# Patient Record
Sex: MALE | Race: WHITE | Employment: FULL TIME | URBAN - METROPOLITAN AREA
[De-identification: names, ages, dates, MRNs, and addresses within clinical notes are randomized per-mention and may not be internally consistent; named-entity substitution may affect disease eponyms.]

---

## 2017-01-19 ENCOUNTER — TELEPHONE (OUTPATIENT)
Dept: INTERNAL MEDICINE CLINIC | Age: 42
End: 2017-01-19

## 2017-01-19 ENCOUNTER — PATIENT MESSAGE (OUTPATIENT)
Dept: INTERNAL MEDICINE CLINIC | Age: 42
End: 2017-01-19

## 2017-01-23 RX ORDER — OMEPRAZOLE 40 MG/1
CAPSULE, DELAYED RELEASE ORAL
Qty: 90 CAPSULE | Refills: 3 | Status: SHIPPED | OUTPATIENT
Start: 2017-01-23 | End: 2017-09-27 | Stop reason: SDUPTHER

## 2017-01-23 RX ORDER — FENOFIBRATE 160 MG/1
TABLET ORAL
Qty: 90 TABLET | Refills: 3 | Status: SHIPPED | OUTPATIENT
Start: 2017-01-23 | End: 2017-09-27 | Stop reason: SDUPTHER

## 2017-01-23 RX ORDER — LISINOPRIL 20 MG/1
TABLET ORAL
Qty: 90 TABLET | Refills: 3 | Status: SHIPPED | OUTPATIENT
Start: 2017-01-23 | End: 2017-09-27 | Stop reason: SDUPTHER

## 2017-01-23 RX ORDER — ATORVASTATIN CALCIUM 20 MG/1
20 TABLET, FILM COATED ORAL DAILY
Qty: 90 TABLET | Refills: 3 | Status: SHIPPED | OUTPATIENT
Start: 2017-01-23 | End: 2017-09-27 | Stop reason: SDUPTHER

## 2017-01-23 RX ORDER — INSULIN GLARGINE 100 [IU]/ML
INJECTION, SOLUTION SUBCUTANEOUS
Qty: 3 VIAL | Refills: 3 | Status: SHIPPED | OUTPATIENT
Start: 2017-01-23 | End: 2017-07-05 | Stop reason: SDUPTHER

## 2017-01-23 RX ORDER — OMEGA-3-ACID ETHYL ESTERS 1 G/1
1 CAPSULE, LIQUID FILLED ORAL DAILY
Qty: 90 CAPSULE | Refills: 3 | Status: SHIPPED | OUTPATIENT
Start: 2017-01-23 | End: 2017-09-27 | Stop reason: SDUPTHER

## 2017-01-26 ENCOUNTER — OFFICE VISIT (OUTPATIENT)
Dept: ORTHOPEDIC SURGERY | Age: 42
End: 2017-01-26

## 2017-01-26 VITALS
WEIGHT: 260 LBS | HEART RATE: 82 BPM | DIASTOLIC BLOOD PRESSURE: 91 MMHG | HEIGHT: 70 IN | RESPIRATION RATE: 12 BRPM | BODY MASS INDEX: 37.22 KG/M2 | SYSTOLIC BLOOD PRESSURE: 125 MMHG

## 2017-01-26 DIAGNOSIS — M25.512 ACUTE PAIN OF LEFT SHOULDER: Primary | ICD-10-CM

## 2017-01-26 DIAGNOSIS — M75.42 SHOULDER IMPINGEMENT, LEFT: ICD-10-CM

## 2017-01-26 PROCEDURE — 99244 OFF/OP CNSLTJ NEW/EST MOD 40: CPT | Performed by: ORTHOPAEDIC SURGERY

## 2017-01-26 PROCEDURE — 73030 X-RAY EXAM OF SHOULDER: CPT | Performed by: ORTHOPAEDIC SURGERY

## 2017-01-26 PROCEDURE — 20610 DRAIN/INJ JOINT/BURSA W/O US: CPT | Performed by: ORTHOPAEDIC SURGERY

## 2017-01-26 ASSESSMENT — ENCOUNTER SYMPTOMS
GASTROINTESTINAL NEGATIVE: 1
EYES NEGATIVE: 1
RESPIRATORY NEGATIVE: 1
BACK PAIN: 1

## 2017-05-08 ENCOUNTER — TELEPHONE (OUTPATIENT)
Dept: BARIATRICS/WEIGHT MGMT | Age: 42
End: 2017-05-08

## 2017-05-12 ASSESSMENT — ENCOUNTER SYMPTOMS
ABDOMINAL PAIN: 0
SHORTNESS OF BREATH: 0

## 2017-05-16 ENCOUNTER — OFFICE VISIT (OUTPATIENT)
Dept: INTERNAL MEDICINE CLINIC | Age: 42
End: 2017-05-16

## 2017-05-16 DIAGNOSIS — E11.65 TYPE 2 DIABETES MELLITUS WITH HYPERGLYCEMIA, WITHOUT LONG-TERM CURRENT USE OF INSULIN (HCC): Primary | ICD-10-CM

## 2017-05-16 DIAGNOSIS — I10 ESSENTIAL HYPERTENSION: ICD-10-CM

## 2017-05-16 DIAGNOSIS — E78.00 HYPERCHOLESTEREMIA: ICD-10-CM

## 2017-05-16 PROCEDURE — 99999 PR OFFICE/OUTPT VISIT,PROCEDURE ONLY: CPT | Performed by: INTERNAL MEDICINE

## 2017-09-21 ENCOUNTER — TELEPHONE (OUTPATIENT)
Dept: INTERNAL MEDICINE CLINIC | Age: 42
End: 2017-09-21

## 2017-09-21 DIAGNOSIS — E78.00 HYPERCHOLESTEREMIA: ICD-10-CM

## 2017-09-21 DIAGNOSIS — E11.65 TYPE 2 DIABETES MELLITUS WITH HYPERGLYCEMIA, WITHOUT LONG-TERM CURRENT USE OF INSULIN (HCC): Primary | ICD-10-CM

## 2017-09-25 ENCOUNTER — OFFICE VISIT (OUTPATIENT)
Dept: ORTHOPEDIC SURGERY | Age: 42
End: 2017-09-25

## 2017-09-25 VITALS
HEART RATE: 85 BPM | WEIGHT: 260 LBS | HEIGHT: 66 IN | DIASTOLIC BLOOD PRESSURE: 82 MMHG | RESPIRATION RATE: 12 BRPM | SYSTOLIC BLOOD PRESSURE: 139 MMHG | BODY MASS INDEX: 41.78 KG/M2

## 2017-09-25 DIAGNOSIS — M25.512 CHRONIC LEFT SHOULDER PAIN: Primary | ICD-10-CM

## 2017-09-25 DIAGNOSIS — G89.29 CHRONIC LEFT SHOULDER PAIN: Primary | ICD-10-CM

## 2017-09-25 DIAGNOSIS — M75.42 SHOULDER IMPINGEMENT, LEFT: ICD-10-CM

## 2017-09-25 PROCEDURE — 20610 DRAIN/INJ JOINT/BURSA W/O US: CPT | Performed by: ORTHOPAEDIC SURGERY

## 2017-09-25 PROCEDURE — 99213 OFFICE O/P EST LOW 20 MIN: CPT | Performed by: ORTHOPAEDIC SURGERY

## 2017-09-25 ASSESSMENT — ENCOUNTER SYMPTOMS
ABDOMINAL PAIN: 0
SHORTNESS OF BREATH: 0

## 2017-09-27 ENCOUNTER — OFFICE VISIT (OUTPATIENT)
Dept: INTERNAL MEDICINE CLINIC | Age: 42
End: 2017-09-27

## 2017-09-27 VITALS
SYSTOLIC BLOOD PRESSURE: 138 MMHG | RESPIRATION RATE: 16 BRPM | HEART RATE: 80 BPM | BODY MASS INDEX: 39.08 KG/M2 | WEIGHT: 273 LBS | HEIGHT: 70 IN | DIASTOLIC BLOOD PRESSURE: 88 MMHG

## 2017-09-27 DIAGNOSIS — E11.65 TYPE 2 DIABETES MELLITUS WITH HYPERGLYCEMIA, WITHOUT LONG-TERM CURRENT USE OF INSULIN (HCC): Primary | ICD-10-CM

## 2017-09-27 DIAGNOSIS — Z23 NEED FOR INFLUENZA VACCINATION: ICD-10-CM

## 2017-09-27 DIAGNOSIS — Z00.00 PREVENTATIVE HEALTH CARE: ICD-10-CM

## 2017-09-27 DIAGNOSIS — E78.00 HYPERCHOLESTEREMIA: ICD-10-CM

## 2017-09-27 DIAGNOSIS — Z90.3 S/P GASTRECTOMY: ICD-10-CM

## 2017-09-27 PROCEDURE — 99214 OFFICE O/P EST MOD 30 MIN: CPT | Performed by: INTERNAL MEDICINE

## 2017-09-27 PROCEDURE — 90471 IMMUNIZATION ADMIN: CPT | Performed by: INTERNAL MEDICINE

## 2017-09-27 PROCEDURE — 90686 IIV4 VACC NO PRSV 0.5 ML IM: CPT | Performed by: INTERNAL MEDICINE

## 2017-09-27 RX ORDER — INSULIN GLARGINE 100 [IU]/ML
INJECTION, SOLUTION SUBCUTANEOUS
Qty: 30 ML | Refills: 1 | Status: SHIPPED | OUTPATIENT
Start: 2017-09-27 | End: 2017-11-18 | Stop reason: SDUPTHER

## 2017-09-27 RX ORDER — OMEGA-3-ACID ETHYL ESTERS 1 G/1
1 CAPSULE, LIQUID FILLED ORAL DAILY
Qty: 90 CAPSULE | Refills: 3 | Status: SHIPPED | OUTPATIENT
Start: 2017-09-27 | End: 2017-11-28 | Stop reason: SDUPTHER

## 2017-09-27 RX ORDER — TRAMADOL HYDROCHLORIDE 50 MG/1
50 TABLET ORAL EVERY 6 HOURS PRN
Qty: 10 TABLET | Refills: 0 | Status: SHIPPED | OUTPATIENT
Start: 2017-09-27 | End: 2017-10-08 | Stop reason: ALTCHOICE

## 2017-09-27 RX ORDER — BLOOD SUGAR DIAGNOSTIC
STRIP MISCELLANEOUS
Qty: 100 EACH | Refills: 3 | Status: SHIPPED | OUTPATIENT
Start: 2017-09-27 | End: 2018-03-15 | Stop reason: SDUPTHER

## 2017-09-27 RX ORDER — OMEPRAZOLE 40 MG/1
CAPSULE, DELAYED RELEASE ORAL
Qty: 90 CAPSULE | Refills: 3 | Status: SHIPPED | OUTPATIENT
Start: 2017-09-27 | End: 2017-11-10 | Stop reason: SDUPTHER

## 2017-09-27 RX ORDER — NAPROXEN 250 MG/1
250 TABLET ORAL 2 TIMES DAILY WITH MEALS
Qty: 60 TABLET | Refills: 3 | Status: SHIPPED | OUTPATIENT
Start: 2017-09-27 | End: 2017-10-08 | Stop reason: ALTCHOICE

## 2017-09-27 RX ORDER — FENOFIBRATE 160 MG/1
TABLET ORAL
Qty: 90 TABLET | Refills: 3 | Status: SHIPPED | OUTPATIENT
Start: 2017-09-27 | End: 2017-11-10 | Stop reason: SDUPTHER

## 2017-09-27 RX ORDER — LISINOPRIL 20 MG/1
TABLET ORAL
Qty: 90 TABLET | Refills: 3 | Status: SHIPPED | OUTPATIENT
Start: 2017-09-27 | End: 2017-11-30 | Stop reason: SDUPTHER

## 2017-09-27 RX ORDER — ATORVASTATIN CALCIUM 20 MG/1
20 TABLET, FILM COATED ORAL DAILY
Qty: 90 TABLET | Refills: 3 | Status: SHIPPED | OUTPATIENT
Start: 2017-09-27 | End: 2017-11-28 | Stop reason: SDUPTHER

## 2017-10-09 ENCOUNTER — TELEPHONE (OUTPATIENT)
Dept: INTERNAL MEDICINE CLINIC | Age: 42
End: 2017-10-09

## 2017-10-09 NOTE — TELEPHONE ENCOUNTER
I called pt. And reminded them of overdue tests and they said they were going to come in and get it done.

## 2017-10-11 LAB
A/G RATIO: 1.6 (ref 1.1–2.2)
ALBUMIN SERPL-MCNC: 3.9 G/DL (ref 3.4–5)
ALP BLD-CCNC: 32 U/L (ref 40–129)
ALT SERPL-CCNC: 25 U/L (ref 10–40)
ANION GAP SERPL CALCULATED.3IONS-SCNC: 15 MMOL/L (ref 3–16)
AST SERPL-CCNC: 18 U/L (ref 15–37)
BASOPHILS ABSOLUTE: 0 K/UL (ref 0–0.2)
BASOPHILS RELATIVE PERCENT: 0.4 %
BILIRUB SERPL-MCNC: 0.3 MG/DL (ref 0–1)
BUN BLDV-MCNC: 20 MG/DL (ref 7–20)
CALCIUM SERPL-MCNC: 9.2 MG/DL (ref 8.3–10.6)
CHLORIDE BLD-SCNC: 101 MMOL/L (ref 99–110)
CHOLESTEROL, TOTAL: 186 MG/DL (ref 0–199)
CO2: 27 MMOL/L (ref 21–32)
CREAT SERPL-MCNC: 0.8 MG/DL (ref 0.9–1.3)
EOSINOPHILS ABSOLUTE: 0 K/UL (ref 0–0.6)
EOSINOPHILS RELATIVE PERCENT: 0.7 %
ESTIMATED AVERAGE GLUCOSE: 223.1 MG/DL
GFR AFRICAN AMERICAN: >60
GFR NON-AFRICAN AMERICAN: >60
GLOBULIN: 2.5 G/DL
GLUCOSE BLD-MCNC: 94 MG/DL (ref 70–99)
HBA1C MFR BLD: 9.4 %
HCT VFR BLD CALC: 43.2 % (ref 40.5–52.5)
HDLC SERPL-MCNC: 45 MG/DL (ref 40–60)
HEMOGLOBIN: 14.6 G/DL (ref 13.5–17.5)
LDL CHOLESTEROL CALCULATED: 110 MG/DL
LYMPHOCYTES ABSOLUTE: 1.4 K/UL (ref 1–5.1)
LYMPHOCYTES RELATIVE PERCENT: 25.4 %
MCH RBC QN AUTO: 30.7 PG (ref 26–34)
MCHC RBC AUTO-ENTMCNC: 33.9 G/DL (ref 31–36)
MCV RBC AUTO: 90.5 FL (ref 80–100)
MONOCYTES ABSOLUTE: 0.4 K/UL (ref 0–1.3)
MONOCYTES RELATIVE PERCENT: 7.5 %
NEUTROPHILS ABSOLUTE: 3.7 K/UL (ref 1.7–7.7)
NEUTROPHILS RELATIVE PERCENT: 66 %
PDW BLD-RTO: 12.7 % (ref 12.4–15.4)
PLATELET # BLD: 134 K/UL (ref 135–450)
PMV BLD AUTO: 9.4 FL (ref 5–10.5)
POTASSIUM SERPL-SCNC: 4 MMOL/L (ref 3.5–5.1)
RBC # BLD: 4.77 M/UL (ref 4.2–5.9)
SODIUM BLD-SCNC: 143 MMOL/L (ref 136–145)
TOTAL PROTEIN: 6.4 G/DL (ref 6.4–8.2)
TRIGL SERPL-MCNC: 153 MG/DL (ref 0–150)
TSH SERPL DL<=0.05 MIU/L-ACNC: 2.32 UIU/ML (ref 0.27–4.2)
VLDLC SERPL CALC-MCNC: 31 MG/DL
WBC # BLD: 5.6 K/UL (ref 4–11)

## 2017-10-31 ENCOUNTER — HOSPITAL ENCOUNTER (OUTPATIENT)
Dept: OTHER | Age: 42
Discharge: HOME | End: 2017-10-31
Attending: ORTHOPAEDIC SURGERY | Admitting: ORTHOPAEDIC SURGERY

## 2017-10-31 NOTE — FLOWSHEET NOTE
Exercise Program:   10/31/17 HEP was instructed and included shrugs, pinches, wand flexion and ABD standing, isometric ABD, serratus punch supine. Pt was given written hand outs and demonstrated exercises correctly. Pt expressed understanding of exercises. PT educated pt regarding the biomechanics of the shoulder and scapula and impingement. Manual Treatments:      Modalities:      Timed Code Treatment Minutes:  20    Total Treatment Minutes:  40    Treatment/Activity Tolerance:  [x] Patient tolerated treatment well [] Patient limited by fatigue  [] Patient limited by pain  [] Patient limited by other medical complications  [] Other:     Prognosis: [x] Good [] Fair  [] Poor    Patient Requires Follow-up: [x] Yes  [] No    Plan:   [] Continue per plan of care [] Alter current plan (see comments)  [x] Plan of care initiated [] Hold pending MD visit [] Discharge  Plan for Next Session: focus RC strengthening and scapulothoracic coordination.      Electronically signed by:  Leonides Negro, PT

## 2017-11-01 ENCOUNTER — HOSPITAL ENCOUNTER (OUTPATIENT)
Dept: OTHER | Age: 42
Discharge: HOME | End: 2017-11-22
Attending: ORTHOPAEDIC SURGERY | Admitting: ORTHOPAEDIC SURGERY

## 2017-11-02 ENCOUNTER — HOSPITAL ENCOUNTER (OUTPATIENT)
Dept: PHYSICAL THERAPY | Age: 42
Discharge: HOME | End: 2017-11-03
Admitting: ORTHOPAEDIC SURGERY

## 2017-11-10 RX ORDER — OMEPRAZOLE 40 MG/1
CAPSULE, DELAYED RELEASE ORAL
Qty: 90 CAPSULE | Refills: 3 | Status: SHIPPED | OUTPATIENT
Start: 2017-11-10 | End: 2018-08-27 | Stop reason: ALTCHOICE

## 2017-11-10 RX ORDER — FENOFIBRATE 160 MG/1
TABLET ORAL
Qty: 90 TABLET | Refills: 3 | Status: SHIPPED | OUTPATIENT
Start: 2017-11-10 | End: 2018-12-14 | Stop reason: SDUPTHER

## 2017-11-20 RX ORDER — INSULIN GLARGINE 100 [IU]/ML
INJECTION, SOLUTION SUBCUTANEOUS
Qty: 30 ML | Refills: 5 | Status: SHIPPED | OUTPATIENT
Start: 2017-11-20 | End: 2017-11-28 | Stop reason: SDUPTHER

## 2017-11-28 ENCOUNTER — PATIENT MESSAGE (OUTPATIENT)
Dept: INTERNAL MEDICINE CLINIC | Age: 42
End: 2017-11-28

## 2017-11-28 DIAGNOSIS — I10 ESSENTIAL HYPERTENSION: Primary | ICD-10-CM

## 2017-11-28 RX ORDER — ATORVASTATIN CALCIUM 20 MG/1
20 TABLET, FILM COATED ORAL DAILY
Qty: 90 TABLET | Refills: 3 | Status: SHIPPED | OUTPATIENT
Start: 2017-11-28 | End: 2018-03-15 | Stop reason: SDUPTHER

## 2017-11-28 RX ORDER — INSULIN GLARGINE 100 [IU]/ML
INJECTION, SOLUTION SUBCUTANEOUS
Qty: 3 VIAL | Refills: 3 | Status: SHIPPED | OUTPATIENT
Start: 2017-11-28 | End: 2018-03-15

## 2017-11-28 RX ORDER — OMEGA-3-ACID ETHYL ESTERS 1 G/1
1 CAPSULE, LIQUID FILLED ORAL DAILY
Qty: 90 CAPSULE | Refills: 3 | Status: SHIPPED | OUTPATIENT
Start: 2017-11-28 | End: 2018-11-03 | Stop reason: SDUPTHER

## 2017-11-28 NOTE — TELEPHONE ENCOUNTER
From: Aure Burrell  To: Lesvia Guaman MD  Sent: 11/28/2017 12:35 PM EST  Subject: Non-Urgent Medical Question    You asked me to let you know if/when I started the Keto Diet as you wanted to run some blood work at 4 weeks in. I started it 3 weeks ago, this is week number 4. I have lost 9.8 lbs so far. My sugar WAS normally around 250-350, since starting this new diet, my sugars have not been over 133.

## 2017-11-30 ENCOUNTER — OFFICE VISIT (OUTPATIENT)
Dept: ENDOCRINOLOGY | Age: 42
End: 2017-11-30

## 2017-11-30 VITALS
BODY MASS INDEX: 38.17 KG/M2 | HEIGHT: 70 IN | WEIGHT: 266.6 LBS | DIASTOLIC BLOOD PRESSURE: 78 MMHG | SYSTOLIC BLOOD PRESSURE: 120 MMHG | HEART RATE: 82 BPM | RESPIRATION RATE: 16 BRPM

## 2017-11-30 DIAGNOSIS — I10 ESSENTIAL HYPERTENSION: ICD-10-CM

## 2017-11-30 DIAGNOSIS — Z79.4 INSULIN-REQUIRING OR DEPENDENT TYPE II DIABETES MELLITUS (HCC): Primary | ICD-10-CM

## 2017-11-30 DIAGNOSIS — E11.9 INSULIN-REQUIRING OR DEPENDENT TYPE II DIABETES MELLITUS (HCC): Primary | ICD-10-CM

## 2017-11-30 LAB
CREATININE URINE: 127.3 MG/DL (ref 39–259)
MICROALBUMIN UR-MCNC: <1.2 MG/DL
MICROALBUMIN/CREAT UR-RTO: NORMAL MG/G (ref 0–30)

## 2017-11-30 PROCEDURE — 99214 OFFICE O/P EST MOD 30 MIN: CPT | Performed by: INTERNAL MEDICINE

## 2017-11-30 RX ORDER — LISINOPRIL 20 MG/1
TABLET ORAL
Qty: 90 TABLET | Refills: 3 | Status: SHIPPED | OUTPATIENT
Start: 2017-11-30 | End: 2018-11-03 | Stop reason: SDUPTHER

## 2017-11-30 NOTE — TELEPHONE ENCOUNTER
lisinopril (PRINIVIL;ZESTRIL) 20 MG tablet TAKE 1 TABLET BY MOUTH EVERY DAY     LYLA Leblanc [address] [place] [address] [phone] - F [phone]

## 2017-12-01 DIAGNOSIS — E11.65 TYPE 2 DIABETES MELLITUS WITH HYPERGLYCEMIA, WITHOUT LONG-TERM CURRENT USE OF INSULIN (HCC): ICD-10-CM

## 2017-12-01 DIAGNOSIS — E78.00 HYPERCHOLESTEREMIA: ICD-10-CM

## 2017-12-01 DIAGNOSIS — I10 ESSENTIAL HYPERTENSION: ICD-10-CM

## 2017-12-01 LAB
A/G RATIO: 1.6 (ref 1.1–2.2)
ALBUMIN SERPL-MCNC: 4 G/DL (ref 3.4–5)
ALP BLD-CCNC: 41 U/L (ref 40–129)
ALT SERPL-CCNC: 59 U/L (ref 10–40)
ANION GAP SERPL CALCULATED.3IONS-SCNC: 17 MMOL/L (ref 3–16)
AST SERPL-CCNC: 41 U/L (ref 15–37)
BASOPHILS ABSOLUTE: 0 K/UL (ref 0–0.2)
BASOPHILS RELATIVE PERCENT: 0.8 %
BILIRUB SERPL-MCNC: 0.9 MG/DL (ref 0–1)
BUN BLDV-MCNC: 19 MG/DL (ref 7–20)
CALCIUM SERPL-MCNC: 9 MG/DL (ref 8.3–10.6)
CHLORIDE BLD-SCNC: 97 MMOL/L (ref 99–110)
CHOLESTEROL, TOTAL: 238 MG/DL (ref 0–199)
CO2: 24 MMOL/L (ref 21–32)
CREAT SERPL-MCNC: 0.9 MG/DL (ref 0.9–1.3)
EOSINOPHILS ABSOLUTE: 0 K/UL (ref 0–0.6)
EOSINOPHILS RELATIVE PERCENT: 0.6 %
GFR AFRICAN AMERICAN: >60
GFR NON-AFRICAN AMERICAN: >60
GLOBULIN: 2.5 G/DL
GLUCOSE BLD-MCNC: 148 MG/DL (ref 70–99)
HCT VFR BLD CALC: 39.2 % (ref 40.5–52.5)
HDLC SERPL-MCNC: 20 MG/DL (ref 40–60)
HEMOGLOBIN: 13.5 G/DL (ref 13.5–17.5)
LDL CHOLESTEROL CALCULATED: ABNORMAL MG/DL
LDL CHOLESTEROL DIRECT: 80 MG/DL
LYMPHOCYTES ABSOLUTE: 0.7 K/UL (ref 1–5.1)
LYMPHOCYTES RELATIVE PERCENT: 19.5 %
MCH RBC QN AUTO: 31 PG (ref 26–34)
MCHC RBC AUTO-ENTMCNC: 34.6 G/DL (ref 31–36)
MCV RBC AUTO: 89.7 FL (ref 80–100)
MONOCYTES ABSOLUTE: 0.6 K/UL (ref 0–1.3)
MONOCYTES RELATIVE PERCENT: 17.1 %
NEUTROPHILS ABSOLUTE: 2.2 K/UL (ref 1.7–7.7)
NEUTROPHILS RELATIVE PERCENT: 62 %
PDW BLD-RTO: 14 % (ref 12.4–15.4)
PLATELET # BLD: 139 K/UL (ref 135–450)
PMV BLD AUTO: 8.8 FL (ref 5–10.5)
POTASSIUM SERPL-SCNC: 4 MMOL/L (ref 3.5–5.1)
RBC # BLD: 4.37 M/UL (ref 4.2–5.9)
SODIUM BLD-SCNC: 138 MMOL/L (ref 136–145)
TOTAL PROTEIN: 6.5 G/DL (ref 6.4–8.2)
TRIGL SERPL-MCNC: 1060 MG/DL (ref 0–150)
VLDLC SERPL CALC-MCNC: ABNORMAL MG/DL
WBC # BLD: 3.5 K/UL (ref 4–11)

## 2017-12-02 LAB
ESTIMATED AVERAGE GLUCOSE: 188.6 MG/DL
HBA1C MFR BLD: 8.2 %

## 2017-12-12 ENCOUNTER — PATIENT MESSAGE (OUTPATIENT)
Dept: INTERNAL MEDICINE CLINIC | Age: 42
End: 2017-12-12

## 2017-12-12 RX ORDER — METHOCARBAMOL 500 MG/1
500 TABLET, FILM COATED ORAL 4 TIMES DAILY
Qty: 40 TABLET | Refills: 0 | Status: SHIPPED | OUTPATIENT
Start: 2017-12-12 | End: 2017-12-22

## 2017-12-12 NOTE — TELEPHONE ENCOUNTER
From: Curtis Davidson  To: Fariha Greenwood MD  Sent: 12/12/2017 11:45 AM EST  Subject: Non-Urgent Medical Question    So, I think I threw my back out again. Happens every couple of years when it's cold outside. I want bending over to get ice from the freezer and felt 2 pops in my lower back. A day later, the pain is horrible, now today, it's just as bad, using a cane to slowly get around. Any kind of muscle relaxer I could get for this by chance? I've done PT in the past with muscle relaxers but I've his my PT allotment for my insurance. It's $350 to go to the ER to tell me what I already know.        Irma Kimr

## 2018-02-12 DIAGNOSIS — E11.65 TYPE 2 DIABETES MELLITUS WITH HYPERGLYCEMIA, WITHOUT LONG-TERM CURRENT USE OF INSULIN (HCC): ICD-10-CM

## 2018-02-12 DIAGNOSIS — E78.00 HYPERCHOLESTEREMIA: Primary | ICD-10-CM

## 2018-03-15 ENCOUNTER — OFFICE VISIT (OUTPATIENT)
Dept: ENDOCRINOLOGY | Age: 43
End: 2018-03-15

## 2018-03-15 VITALS
SYSTOLIC BLOOD PRESSURE: 138 MMHG | DIASTOLIC BLOOD PRESSURE: 78 MMHG | HEART RATE: 92 BPM | BODY MASS INDEX: 35.48 KG/M2 | HEIGHT: 70 IN | WEIGHT: 247.8 LBS | RESPIRATION RATE: 16 BRPM | OXYGEN SATURATION: 99 %

## 2018-03-15 DIAGNOSIS — I10 ESSENTIAL HYPERTENSION: ICD-10-CM

## 2018-03-15 DIAGNOSIS — E78.00 HYPERCHOLESTEREMIA: ICD-10-CM

## 2018-03-15 DIAGNOSIS — Z79.4 TYPE 2 DIABETES MELLITUS WITH HYPERGLYCEMIA, WITH LONG-TERM CURRENT USE OF INSULIN (HCC): Primary | ICD-10-CM

## 2018-03-15 DIAGNOSIS — E11.65 TYPE 2 DIABETES MELLITUS WITH HYPERGLYCEMIA, WITHOUT LONG-TERM CURRENT USE OF INSULIN (HCC): ICD-10-CM

## 2018-03-15 DIAGNOSIS — E11.65 TYPE 2 DIABETES MELLITUS WITH HYPERGLYCEMIA, WITH LONG-TERM CURRENT USE OF INSULIN (HCC): Primary | ICD-10-CM

## 2018-03-15 LAB
A/G RATIO: 2.1 (ref 1.1–2.2)
ALBUMIN SERPL-MCNC: 4.8 G/DL (ref 3.4–5)
ALP BLD-CCNC: 27 U/L (ref 40–129)
ALT SERPL-CCNC: 36 U/L (ref 10–40)
ANION GAP SERPL CALCULATED.3IONS-SCNC: 20 MMOL/L (ref 3–16)
AST SERPL-CCNC: 24 U/L (ref 15–37)
BILIRUB SERPL-MCNC: 0.4 MG/DL (ref 0–1)
BUN BLDV-MCNC: 22 MG/DL (ref 7–20)
CALCIUM SERPL-MCNC: 9.6 MG/DL (ref 8.3–10.6)
CHLORIDE BLD-SCNC: 100 MMOL/L (ref 99–110)
CHOLESTEROL, TOTAL: 197 MG/DL (ref 0–199)
CO2: 24 MMOL/L (ref 21–32)
CREAT SERPL-MCNC: 0.9 MG/DL (ref 0.9–1.3)
GFR AFRICAN AMERICAN: >60
GFR NON-AFRICAN AMERICAN: >60
GLOBULIN: 2.3 G/DL
GLUCOSE BLD-MCNC: 94 MG/DL (ref 70–99)
HDLC SERPL-MCNC: 40 MG/DL (ref 40–60)
LDL CHOLESTEROL CALCULATED: 130 MG/DL
POTASSIUM SERPL-SCNC: 3.8 MMOL/L (ref 3.5–5.1)
SODIUM BLD-SCNC: 144 MMOL/L (ref 136–145)
TOTAL PROTEIN: 7.1 G/DL (ref 6.4–8.2)
TRIGL SERPL-MCNC: 135 MG/DL (ref 0–150)
VLDLC SERPL CALC-MCNC: 27 MG/DL

## 2018-03-15 PROCEDURE — 99214 OFFICE O/P EST MOD 30 MIN: CPT | Performed by: INTERNAL MEDICINE

## 2018-03-15 RX ORDER — ATORVASTATIN CALCIUM 40 MG/1
40 TABLET, FILM COATED ORAL DAILY
Qty: 90 TABLET | Refills: 2 | Status: SHIPPED | OUTPATIENT
Start: 2018-03-15 | End: 2018-11-26 | Stop reason: SDUPTHER

## 2018-03-15 NOTE — PROGRESS NOTES
Seen as f/u patient for diabetes    Interim: on ketodiet    had bariatric surgery-sleeve gastrectomy , lost 90lb, off U-500 but now stable    Diagnosed with Type 2 diabetes mellitus in  1996  Known diabetic complications: none     Current diabetic medications   Lantus 18units  But taking 35 units occasionally  Metformin 1gm daily    Previous  U-500 40/35 units BID SSI 1 for 50 >150  -10 <80    Taking  25-35 once a day    Farxiga 5mg stopped due to yeast infection    Initial  Lantus 110 unit BID  Metformin 1000mg BID    Last A1c 9.4 on 10/17<------7.8 on 2/16<-----7.6 on 6/15<-----9.9 on 2/15<-----  8.0 on 11/14<----- 8.3 on 7/14<----10.6 on 3/14<---11.9 on 1/14<--- 9.4<---10.8    Prior visit with dietician: yes  Current diet: 0 Carbs low Isaiah  Current exercise: walking   Current monitoring regimen: home blood tests -1 times daily     Has brought blood glucose log/meter:Yes   Home blood sugar records:   137-357 when on regular diet  Any episodes of hypoglycemia? Occ    No Hx of CAD , PVD, CVA  H/o pancreatitis 2006    Hyperlipidemia:1/14 T.C 544 TG 3800 HDL 76   8/13     Fenofibrate 160 Lipitor 20mg    on 3/14 - he stopped fenofibrate and lipitor, now restarted    on 10/17  lipitor and fenofibrate      On 3/18    Last eye exam: 1.5 yr ago  Last foot exam: 3/18  Last microalbumin to creatinine ratio: 58 on 1/14---> 11/17  ACE-I or ARB: Lisinopril 20mg  ASA No  Smoking No    FH of diabetes, mom was diabetic, she was +600 units with no good control. TG was 28104. Sister does not have diabetes  Lost weight from 400 to 310 over 8 years    Review of Systems  Scanned lost 150lb    Objective:      /78 (Site: Right Arm, Position: Sitting, Cuff Size: Medium Adult)   Pulse 92   Resp 16   Ht 5' 10\" (1.778 m)   Wt 247 lb 12.8 oz (112.4 kg)   SpO2 99%   BMI 35.56 kg/m²   Wt Readings from Last 3 Encounters:   03/15/18 247 lb 12.8 oz (112.4 kg)   11/30/17 266 lb 9.6 oz (120.9 kg)   10/08/17 272 lb 6.4 oz (123.6 kg)     Constitutional: Well-developed, alert, appears stated age, cooperative, in no acute distress  H/E/N/M/T:atraumatic, normocephalic, external ears, nose, lips normal without lesions  Skin: Xanthoma/Xanthelasmas are  not present   Psychiatric: Judgement and Insight:  judgement and insight appear normal  Neuro: Normal without focal findings, speech is spontaneous, and movements are coordinated, alert and oriented x3     3/18  Skeletal foot exam is normal, no skin lesions, 10 g monofilament is 10/10 on the right and 10/10 on the left    Lab Reviewed   No components found for: CHLPL  Lab Results   Component Value Date    TRIG 135 03/15/2018    TRIG 1,060 (H) 12/01/2017    TRIG 153 (H) 10/11/2017     Lab Results   Component Value Date    HDL 40 03/15/2018    HDL 20 (L) 12/01/2017    HDL 45 10/11/2017     Lab Results   Component Value Date    LDLCALC 130 (H) 03/15/2018    LDLCALC see below 12/01/2017    LDLCALC 110 (H) 10/11/2017     Lab Results   Component Value Date    LABVLDL 27 03/15/2018    LABVLDL see below 12/01/2017    LABVLDL 31 10/11/2017     Lab Results   Component Value Date    LABA1C 8.2 12/01/2017       Assessment:     Marcin Rider is a 40 y.o. male with :    1.T2DM:Insulin resistant secondary to obesity, + FH. Avoid GLP agonist given pancreatitis, A1c high, recently started Keto diet. Reviewed log, blood glucose improved, A1c pending  2. HTN:At goal  3. HLD:He has familial hyperlipidemia. Discussed dietary restriction, continue fibrate and statin, added fish oil, TG improved, LDL high, increased lipitor  4. Obesity: s/p sleeve,losing weight    Plan:      Lantus 35 units daily   Add Humalog SSI if blood glucose persistently high >200   Avoid GLP given pancreatitis history   Advise to check blood sugar 2 times a day   Patient to send blood sugar log for titration. Advise to exercise regularly. Advise to low simple carbohydrate and protein with each  meal diet.

## 2018-03-16 ENCOUNTER — TELEPHONE (OUTPATIENT)
Dept: INTERNAL MEDICINE CLINIC | Age: 43
End: 2018-03-16

## 2018-03-16 LAB
ESTIMATED AVERAGE GLUCOSE: 105.4 MG/DL
HBA1C MFR BLD: 5.3 %

## 2018-05-08 ENCOUNTER — OFFICE VISIT (OUTPATIENT)
Dept: ORTHOPEDIC SURGERY | Age: 43
End: 2018-05-08

## 2018-05-08 VITALS
DIASTOLIC BLOOD PRESSURE: 88 MMHG | SYSTOLIC BLOOD PRESSURE: 122 MMHG | BODY MASS INDEX: 33.57 KG/M2 | HEART RATE: 82 BPM | WEIGHT: 239.8 LBS | HEIGHT: 71 IN

## 2018-05-08 DIAGNOSIS — M25.512 CHRONIC LEFT SHOULDER PAIN: Primary | ICD-10-CM

## 2018-05-08 DIAGNOSIS — M75.42 SHOULDER IMPINGEMENT, LEFT: ICD-10-CM

## 2018-05-08 DIAGNOSIS — G89.29 CHRONIC LEFT SHOULDER PAIN: Primary | ICD-10-CM

## 2018-05-08 PROCEDURE — 99213 OFFICE O/P EST LOW 20 MIN: CPT | Performed by: ORTHOPAEDIC SURGERY

## 2018-05-08 PROCEDURE — 20610 DRAIN/INJ JOINT/BURSA W/O US: CPT | Performed by: ORTHOPAEDIC SURGERY

## 2018-05-08 RX ORDER — METHYLPREDNISOLONE ACETATE 40 MG/ML
80 INJECTION, SUSPENSION INTRA-ARTICULAR; INTRALESIONAL; INTRAMUSCULAR; SOFT TISSUE ONCE
Status: COMPLETED | OUTPATIENT
Start: 2018-05-08 | End: 2018-05-08

## 2018-05-08 RX ADMIN — METHYLPREDNISOLONE ACETATE 80 MG: 40 INJECTION, SUSPENSION INTRA-ARTICULAR; INTRALESIONAL; INTRAMUSCULAR; SOFT TISSUE at 16:00

## 2018-05-08 ASSESSMENT — ENCOUNTER SYMPTOMS
RESPIRATORY NEGATIVE: 1
GASTROINTESTINAL NEGATIVE: 1
EYES NEGATIVE: 1

## 2018-06-21 ENCOUNTER — OFFICE VISIT (OUTPATIENT)
Dept: INTERNAL MEDICINE CLINIC | Age: 43
End: 2018-06-21

## 2018-06-21 VITALS
DIASTOLIC BLOOD PRESSURE: 88 MMHG | BODY MASS INDEX: 34.97 KG/M2 | TEMPERATURE: 98.6 F | HEIGHT: 71 IN | OXYGEN SATURATION: 98 % | SYSTOLIC BLOOD PRESSURE: 132 MMHG | WEIGHT: 249.8 LBS | HEART RATE: 78 BPM

## 2018-06-21 DIAGNOSIS — I10 ESSENTIAL HYPERTENSION: ICD-10-CM

## 2018-06-21 DIAGNOSIS — Z00.00 PHYSICAL EXAM: Primary | ICD-10-CM

## 2018-06-21 DIAGNOSIS — E66.09 CLASS 1 OBESITY DUE TO EXCESS CALORIES WITH SERIOUS COMORBIDITY AND BODY MASS INDEX (BMI) OF 34.0 TO 34.9 IN ADULT: ICD-10-CM

## 2018-06-21 DIAGNOSIS — E11.9 TYPE 2 DIABETES MELLITUS WITHOUT COMPLICATION, WITH LONG-TERM CURRENT USE OF INSULIN (HCC): ICD-10-CM

## 2018-06-21 DIAGNOSIS — Z79.4 TYPE 2 DIABETES MELLITUS WITHOUT COMPLICATION, WITH LONG-TERM CURRENT USE OF INSULIN (HCC): ICD-10-CM

## 2018-06-21 LAB — PROSTATE SPECIFIC ANTIGEN: 0.4 NG/ML (ref 0–4)

## 2018-06-21 PROCEDURE — 99396 PREV VISIT EST AGE 40-64: CPT | Performed by: NURSE PRACTITIONER

## 2018-06-21 ASSESSMENT — ENCOUNTER SYMPTOMS
VOMITING: 0
SHORTNESS OF BREATH: 0
ABDOMINAL PAIN: 0
DIARRHEA: 0
COUGH: 0
NAUSEA: 0

## 2018-06-21 ASSESSMENT — PATIENT HEALTH QUESTIONNAIRE - PHQ9
SUM OF ALL RESPONSES TO PHQ QUESTIONS 1-9: 0
SUM OF ALL RESPONSES TO PHQ9 QUESTIONS 1 & 2: 0
1. LITTLE INTEREST OR PLEASURE IN DOING THINGS: 0
2. FEELING DOWN, DEPRESSED OR HOPELESS: 0

## 2018-07-24 ENCOUNTER — TELEPHONE (OUTPATIENT)
Dept: INTERNAL MEDICINE CLINIC | Age: 43
End: 2018-07-24
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
## 2018-07-24 RX ORDER — DOXYCYCLINE HYCLATE 100 MG
100 TABLET ORAL 2 TIMES DAILY
Qty: 14 TABLET | Refills: 0 | Status: SHIPPED | OUTPATIENT
Start: 2018-07-24 | End: 2018-07-31

## 2018-08-02 ENCOUNTER — OFFICE VISIT (OUTPATIENT)
Dept: ENDOCRINOLOGY | Age: 43
End: 2018-08-02

## 2018-08-02 VITALS
BODY MASS INDEX: 35.96 KG/M2 | OXYGEN SATURATION: 99 % | DIASTOLIC BLOOD PRESSURE: 88 MMHG | HEART RATE: 74 BPM | WEIGHT: 251.2 LBS | SYSTOLIC BLOOD PRESSURE: 142 MMHG | HEIGHT: 70 IN | RESPIRATION RATE: 16 BRPM

## 2018-08-02 DIAGNOSIS — I10 ESSENTIAL HYPERTENSION: ICD-10-CM

## 2018-08-02 DIAGNOSIS — E11.65 TYPE 2 DIABETES MELLITUS WITH HYPERGLYCEMIA, WITH LONG-TERM CURRENT USE OF INSULIN (HCC): Primary | ICD-10-CM

## 2018-08-02 DIAGNOSIS — Z79.4 TYPE 2 DIABETES MELLITUS WITH HYPERGLYCEMIA, WITH LONG-TERM CURRENT USE OF INSULIN (HCC): Primary | ICD-10-CM

## 2018-08-02 DIAGNOSIS — E78.00 HYPERCHOLESTEREMIA: ICD-10-CM

## 2018-08-02 LAB — HBA1C MFR BLD: 6 %

## 2018-08-02 PROCEDURE — 99214 OFFICE O/P EST MOD 30 MIN: CPT | Performed by: INTERNAL MEDICINE

## 2018-08-02 PROCEDURE — 83036 HEMOGLOBIN GLYCOSYLATED A1C: CPT | Performed by: INTERNAL MEDICINE

## 2018-08-02 NOTE — PROGRESS NOTES
Seen as f/u patient for diabetes    Interim:  Glucose stable    had bariatric surgery-sleeve gastrectomy , lost 90lb, off U-500 but now stable    Diagnosed with Type 2 diabetes mellitus in  1996  Known diabetic complications: none     Current diabetic medications   Lantus 35 units  Metformin 1gm daily    Previous  U-500 40/35 units BID SSI 1 for 50 >150  -10 <80    Taking  25-35 once a day    Farxiga 5mg stopped due to yeast infection    Initial  Lantus 110 unit BID  Metformin 1000mg BID    Last A1c 6%<------9.4 on 10/17<------7.8 on 2/16<-----7.6 on 6/15<-----9.9 on 2/15<-----  8.0 on 11/14<----- 8.3 on 7/14<----10.6 on 3/14<---11.9 on 1/14<--- 9.4<---10.8    Prior visit with dietician: yes  Current diet: 0 Carbs low Isaiah  Current exercise: walking   Current monitoring regimen: home blood tests -1 times daily     Has brought blood glucose log/meter:Yes   Home blood sugar records: 105-18  Any episodes of hypoglycemia? Occ    No Hx of CAD , PVD, CVA  H/o pancreatitis 2006    Hyperlipidemia:  For years, tolerating medication  1/14 T.C 544 TG 3800 HDL 76   8/13     Fenofibrate 160 Lipitor 20mg    on 3/14 - he stopped fenofibrate and lipitor, now restarted    on 10/17  lipitor and fenofibrate      On 3/18     Last eye exam: 1.5 yr ago  Last foot exam: 3/18  Last microalbumin to creatinine ratio: 58 on 1/14---> 11/17    HTN: stable but now high  Lisinopril 20mg  ASA No  Smoking No    FH of diabetes, mom was diabetic, she was +600 units with no good control. TG was 92716. Sister does not have diabetes  Lost weight from 400 to 310 over 8 years    Review of Systems  Scanned reviewed total lost 150lb  12 lb gain since last visit    Objective:      BP (!) 142/88   Pulse 74   Resp 16   Ht 5' 10\" (1.778 m)   Wt 251 lb 3.2 oz (113.9 kg)   SpO2 99%   BMI 36.04 kg/m²   Wt Readings from Last 3 Encounters:   08/02/18 251 lb 3.2 oz (113.9 kg)   06/21/18 249 lb 12.8 oz (113.3 kg) diet.    Diabetes Care: recommend yearly eye exam, foot exam and urine microalbumin to   creatinine ratio.  Patient is due    -Hyperlipidemia, LDL goal is <100 mg/dl    Once off insulin, will see PCP only

## 2018-08-07 ENCOUNTER — TELEPHONE (OUTPATIENT)
Dept: ORTHOPEDIC SURGERY | Age: 43
End: 2018-08-07

## 2018-08-07 ENCOUNTER — PATIENT MESSAGE (OUTPATIENT)
Dept: ORTHOPEDIC SURGERY | Age: 43
End: 2018-08-07

## 2018-08-07 DIAGNOSIS — M75.42 SHOULDER IMPINGEMENT, LEFT: Primary | ICD-10-CM

## 2018-08-07 NOTE — TELEPHONE ENCOUNTER
From: Eduardo Sandoval  To: Sarita Humphrey MD  Sent: 8/7/2018 8:43 AM EDT  Subject: Non-Urgent Medical Question    Doctor Elizabeth Phan, I scheduled an appointment for Thurs for my left arm/shoulder. It is still painful and bothering me. You mentioned at the last appointment you wanted to get an MRI done if it's still an issue. Do I need to come in just to go get an MRI? Or is that something that will be done in another room at my appointment on Thursday?     Thank you,  Yonatan Kumari

## 2018-08-09 ENCOUNTER — OFFICE VISIT (OUTPATIENT)
Dept: ORTHOPEDIC SURGERY | Age: 43
End: 2018-08-09

## 2018-08-09 VITALS
DIASTOLIC BLOOD PRESSURE: 102 MMHG | SYSTOLIC BLOOD PRESSURE: 151 MMHG | BODY MASS INDEX: 35.14 KG/M2 | HEART RATE: 72 BPM | WEIGHT: 251 LBS | HEIGHT: 71 IN

## 2018-08-09 DIAGNOSIS — M75.42 SHOULDER IMPINGEMENT, LEFT: Primary | ICD-10-CM

## 2018-08-09 DIAGNOSIS — S43.432A SUPERIOR LABRUM ANTERIOR-TO-POSTERIOR (SLAP) TEAR OF LEFT SHOULDER: ICD-10-CM

## 2018-08-09 DIAGNOSIS — G89.29 CHRONIC LEFT SHOULDER PAIN: ICD-10-CM

## 2018-08-09 DIAGNOSIS — M25.512 CHRONIC LEFT SHOULDER PAIN: ICD-10-CM

## 2018-08-09 PROCEDURE — L3670 SO ACRO/CLAV CAN WEB PRE OTS: HCPCS | Performed by: ORTHOPAEDIC SURGERY

## 2018-08-09 PROCEDURE — 99213 OFFICE O/P EST LOW 20 MIN: CPT | Performed by: ORTHOPAEDIC SURGERY

## 2018-08-10 ENCOUNTER — TELEPHONE (OUTPATIENT)
Dept: ORTHOPEDIC SURGERY | Age: 43
End: 2018-08-10

## 2018-08-14 ENCOUNTER — OFFICE VISIT (OUTPATIENT)
Dept: INTERNAL MEDICINE CLINIC | Age: 43
End: 2018-08-14

## 2018-08-14 VITALS
OXYGEN SATURATION: 98 % | SYSTOLIC BLOOD PRESSURE: 138 MMHG | TEMPERATURE: 99.9 F | BODY MASS INDEX: 37.11 KG/M2 | HEART RATE: 82 BPM | WEIGHT: 259.2 LBS | HEIGHT: 70 IN | DIASTOLIC BLOOD PRESSURE: 88 MMHG

## 2018-08-14 DIAGNOSIS — Z01.818 PRE-OP EXAM: Primary | ICD-10-CM

## 2018-08-14 PROCEDURE — 99244 OFF/OP CNSLTJ NEW/EST MOD 40: CPT | Performed by: NURSE PRACTITIONER

## 2018-08-14 ASSESSMENT — ENCOUNTER SYMPTOMS
DIARRHEA: 0
COUGH: 0
VOMITING: 0
SHORTNESS OF BREATH: 0
NAUSEA: 0
ABDOMINAL PAIN: 0

## 2018-08-14 NOTE — PROGRESS NOTES
Preoperative Consultation      Kaye Sullivan  YOB: 1975    Date of Service:  8/14/2018    Vitals:    08/14/18 1603   BP: 138/88   Site: Left Arm   Position: Sitting   Cuff Size: Small Adult   Pulse: 82   Temp: 99.9 °F (37.7 °C)   TempSrc: Oral   SpO2: 98%   Weight: 259 lb 3.2 oz (117.6 kg)   Height: 5' 10\" (1.778 m)      Wt Readings from Last 2 Encounters:   08/14/18 259 lb 3.2 oz (117.6 kg)   08/09/18 251 lb (113.9 kg)     BP Readings from Last 3 Encounters:   08/14/18 138/88   08/09/18 (!) 151/102   08/02/18 (!) 142/88        No Known Allergies  Outpatient Prescriptions Marked as Taking for the 8/14/18 encounter (Office Visit) with LIBORIO Godoy CNP   Medication Sig Dispense Refill    Blood Glucose Monitoring Suppl (ACCU-CHEK COMPACT CARE KIT) KIT accu-chek glucose kit 1 kit 0    glucose blood VI test strips (ACCU-CHEK ACTIVE STRIPS) strip 1 each by In Vitro route 3 times daily As needed.  100 each 3    atorvastatin (LIPITOR) 40 MG tablet Take 1 tablet by mouth daily 90 tablet 2    insulin glargine (LANTUS SOLOSTAR) 100 UNIT/ML injection pen 35 units daily 15 pen 3    Insulin Pen Needle 32G X 4 MM MISC 1 each by Does not apply route daily 100 each 3    lisinopril (PRINIVIL;ZESTRIL) 20 MG tablet TAKE 1 TABLET BY MOUTH EVERY DAY 90 tablet 3    omega-3 acid ethyl esters (LOVAZA) 1 g capsule Take 1 capsule by mouth daily 90 capsule 3    omeprazole (PRILOSEC) 40 MG delayed release capsule TAKE 1 CAPSULE BY MOUTH DAILY 90 capsule 3    fenofibrate 160 MG tablet TAKE 1 TABLET BY MOUTH DAILY 90 tablet 3    metFORMIN (GLUCOPHAGE) 1000 MG tablet TAKE 1 TABLET BY MOUTH TWICE DAILY WITH MEALS 180 tablet 3    Insulin Pen Needle (B-D UF III MINI PEN NEEDLES) 31G X 5 MM MISC 1 each by Does not apply route daily 100 each 6    Insulin Syringes, Disposable, U-100 1 ML MISC 1 each by Does not apply route daily 3/4 inch, 31 Gauge 100 each 3    glucose blood VI test strips ([employer] TEST STRIPS) strip 1 each by In Vitro route 2 times daily. As needed. 100 each 3    Insulin Syringe-Needle U-100 (B-D INS SYRINGE 0.5CC/31GX5/16) 31G X 5/16\" 0.5 ML MISC  each 3    aspirin 81 MG EC tablet Take 81 mg by mouth daily. Chief Complaint   Patient presents with    Pre-op Exam     The following comorbid conditions were considered relevant to operative risk, so their status/stability was assessed:  Hyperlipidemia, hypertension, type 2 diabetes-insulin-dependent, GERD, sleep apnea-untreated, history of sleeve gastrectomy.      HPI:    This patient presents to the office today for a preoperative consultation at the request of surgeon, Dr. Eric Waters, who plans on performing left shoulder surgery on August 29 at [facility].     Planned anesthesia: General   Known anesthesia problems: None   Bleeding risk: No recent or remote history of abnormal bleeding  Personal or FH of DVT/PE: No   Recent steroid use: no  Exercise tolerance: Able to walk 3 blocks without dyspnea   Patient objection to receiving blood products: No    Patient Active Problem List   Diagnosis    Abdominal pain    Essential hypertension    Type 2 diabetes mellitus with hyperglycemia ([address]    Hypercholesteremia    Otalgia    Familial hyperlipidemia    Diabetes type 2, uncontrolled ([address]    Left leg pain    Sleep apnea    GERD (gastroesophageal reflux disease)    Migraine    Back pain    Morbid obesity with BMI of 40.0-44.9, adult ([address]    S/P sleeve gastrectomy    Change in bowel function       Past Medical History:   Diagnosis Date    Diabetes mellitus ([address]     GERD (gastroesophageal reflux disease)     Hyperlipidemia     Hypertension     Sleep apnea     Type II or unspecified type diabetes mellitus without mention of complication, not stated as uncontrolled      Past Surgical History:   Procedure Laterality Date    DENTAL SURGERY      SLEEVE GASTRECTOMY  4/7/15    laparoscopic    UPPER GASTROINTESTINAL ENDOSCOPY  11/14/14 negative. Physical Exam   Constitutional: He is oriented to person, place, and time. He appears well-developed and well-nourished. No distress. HENT:   Head: Normocephalic and atraumatic. Eyes: Pupils are equal, round, and reactive to light. Conjunctivae are normal.   Neck: Normal range of motion. Neck supple. Cardiovascular: Normal rate, regular rhythm, normal heart sounds and intact distal pulses. Exam reveals no gallop and no friction rub. No murmur heard. Pulmonary/Chest: Effort normal and breath sounds normal. No respiratory distress. He has no wheezes. He has no rales. Musculoskeletal: Normal range of motion. Neurological: He is alert and oriented to person, place, and time. Skin: Skin is warm and dry. No rash noted. He is not diaphoretic. Psychiatric: He has a normal mood and affect. His behavior is normal. Judgment and thought content normal.   Nursing note and vitals reviewed. Assessment/Plan:     Gail Zepeda was seen today for pre-op exam.    Diagnoses and all orders for this visit:    Pre-op exam    1. Preoperative workup as follows: none  2. Change in medication regimen before surgery: Discontinue ASA, ibuprofen and omega 3 vitamin 7 days before surgery  3. Prophylaxis for cardiac events with perioperative beta-blockers:Not indicated    4. Deep vein thrombosis prophylaxis: to be determined by surgical team  5. No contraindications to planned surgery      Active Cardiac Conditions: HTN- controlled   Active Pulmonary Conditions: h/o sleep apnea - better since weight loss, never used cpap  History of Cardiac Conditions: none  History of Pulmonary Conditions: former smoker- quit 25 years ago    Previous surgeries requiring anesthesia: wisdom teeth, sleeve gastrectomy, endoscopy    Clinical Risk Factors: CAD: 1 ,  DM: 1,  CHF: 0,  CVA: 0,  Renal Disease: 0    Based on the Revised Cardiac Risk Index the pt is Class Class II risk for surgery.

## 2018-08-27 ENCOUNTER — PAT TELEPHONE (OUTPATIENT)
Dept: PREADMISSION TESTING | Age: 43
End: 2018-08-27

## 2018-08-27 VITALS — BODY MASS INDEX: 36.08 KG/M2 | HEIGHT: 70 IN | WEIGHT: 252 LBS

## 2018-08-27 ASSESSMENT — PAIN DESCRIPTION - PAIN TYPE: TYPE: ACUTE PAIN

## 2018-08-27 ASSESSMENT — PAIN SCALES - GENERAL: PAINLEVEL_OUTOF10: 4

## 2018-08-27 ASSESSMENT — PAIN DESCRIPTION - FREQUENCY: FREQUENCY: INTERMITTENT

## 2018-08-27 ASSESSMENT — PAIN DESCRIPTION - ORIENTATION: ORIENTATION: LEFT

## 2018-08-27 ASSESSMENT — PAIN - FUNCTIONAL ASSESSMENT: PAIN_FUNCTIONAL_ASSESSMENT: 0-10

## 2018-08-27 ASSESSMENT — PAIN DESCRIPTION - LOCATION: LOCATION: ARM;SHOULDER

## 2018-08-27 ASSESSMENT — PAIN DESCRIPTION - DESCRIPTORS: DESCRIPTORS: SHOOTING;NUMBNESS

## 2018-08-27 NOTE — PROGRESS NOTES
C-Difficile admission screening and protocol:     * Admitted with diarrhea? YES____    NO__X___     *Prior history of C-Diff. In last 3 months? YES____   NO_X____     *Antibiotic use in the past 6-8 weeks? NO______YES__X--tick bite____                 If yes which  ANTIBIOTIC AND REASON______     *Prior hospitalization or nursing home in the last month?  YES____   NO_X___

## 2018-08-28 ENCOUNTER — TELEPHONE (OUTPATIENT)
Dept: ORTHOPEDIC SURGERY | Age: 43
End: 2018-08-28

## 2018-08-28 DIAGNOSIS — M25.512 CHRONIC LEFT SHOULDER PAIN: ICD-10-CM

## 2018-08-28 DIAGNOSIS — M75.42 SHOULDER IMPINGEMENT, LEFT: Primary | ICD-10-CM

## 2018-08-28 DIAGNOSIS — M25.512 ACUTE PAIN OF LEFT SHOULDER: ICD-10-CM

## 2018-08-28 DIAGNOSIS — G89.29 CHRONIC LEFT SHOULDER PAIN: ICD-10-CM

## 2018-08-28 DIAGNOSIS — S43.432A SUPERIOR LABRUM ANTERIOR-TO-POSTERIOR (SLAP) TEAR OF LEFT SHOULDER: ICD-10-CM

## 2018-08-28 RX ORDER — PROMETHAZINE HYDROCHLORIDE 25 MG/1
25 TABLET ORAL EVERY 6 HOURS PRN
Qty: 30 TABLET | Refills: 0 | Status: SHIPPED | OUTPATIENT
Start: 2018-08-28 | End: 2018-09-04

## 2018-08-28 RX ORDER — OXYCODONE HYDROCHLORIDE AND ACETAMINOPHEN 5; 325 MG/1; MG/1
1 TABLET ORAL EVERY 6 HOURS PRN
Qty: 28 TABLET | Refills: 0 | Status: SHIPPED | OUTPATIENT
Start: 2018-08-29 | End: 2018-09-06

## 2018-08-28 RX ORDER — DOCUSATE SODIUM 100 MG/1
100 CAPSULE, LIQUID FILLED ORAL 2 TIMES DAILY
Qty: 60 CAPSULE | Refills: 0 | Status: SHIPPED | OUTPATIENT
Start: 2018-08-28 | End: 2018-11-06

## 2018-08-28 NOTE — TELEPHONE ENCOUNTER
Called patient and spoke with patient regarding surgery. Patient will arrive at [facility] at 7:25 for surgery at 9:25 with sling. NPO at midnight.

## 2018-08-29 ENCOUNTER — HOSPITAL ENCOUNTER (OUTPATIENT)
Dept: SURGERY | Age: 43
Discharge: HOME | End: 2018-08-29
Attending: ORTHOPAEDIC SURGERY | Admitting: ORTHOPAEDIC SURGERY

## 2018-08-29 VITALS
HEART RATE: 74 BPM | OXYGEN SATURATION: 98 % | WEIGHT: 254.41 LBS | TEMPERATURE: 96.8 F | DIASTOLIC BLOOD PRESSURE: 81 MMHG | SYSTOLIC BLOOD PRESSURE: 136 MMHG | HEIGHT: 70 IN | RESPIRATION RATE: 18 BRPM | BODY MASS INDEX: 36.42 KG/M2

## 2018-08-29 LAB
GLUCOSE BLD-MCNC: 107 MG/DL (ref 70–99)
GLUCOSE BLD-MCNC: 91 MG/DL (ref 70–99)
PERFORMED ON: ABNORMAL
PERFORMED ON: NORMAL

## 2018-08-29 RX ORDER — SODIUM CHLORIDE 0.9 % (FLUSH) 0.9 %
10 SYRINGE (ML) INJECTION PRN
Status: DISCONTINUED | OUTPATIENT
Start: 2018-08-29 | End: 2018-08-30 | Stop reason: HOSPADM

## 2018-08-29 RX ORDER — ONDANSETRON 2 MG/ML
4 INJECTION INTRAMUSCULAR; INTRAVENOUS
Status: COMPLETED | OUTPATIENT
Start: 2018-08-29 | End: 2018-08-29

## 2018-08-29 RX ORDER — OXYCODONE HYDROCHLORIDE AND ACETAMINOPHEN 5; 325 MG/1; MG/1
1 TABLET ORAL PRN
Status: ACTIVE | OUTPATIENT
Start: 2018-08-29 | End: 2018-08-29

## 2018-08-29 RX ORDER — MIDAZOLAM HYDROCHLORIDE 1 MG/ML
INJECTION INTRAMUSCULAR; INTRAVENOUS
Status: DISPENSED
Start: 2018-08-29 | End: 2018-08-29

## 2018-08-29 RX ORDER — FENTANYL CITRATE 50 UG/ML
INJECTION, SOLUTION INTRAMUSCULAR; INTRAVENOUS
Status: DISPENSED
Start: 2018-08-29 | End: 2018-08-29

## 2018-08-29 RX ORDER — FENTANYL CITRATE 50 UG/ML
25 INJECTION, SOLUTION INTRAMUSCULAR; INTRAVENOUS EVERY 5 MIN PRN
Status: DISCONTINUED | OUTPATIENT
Start: 2018-08-29 | End: 2018-08-30 | Stop reason: HOSPADM

## 2018-08-29 RX ORDER — SODIUM CHLORIDE 0.9 % (FLUSH) 0.9 %
10 SYRINGE (ML) INJECTION EVERY 12 HOURS SCHEDULED
Status: DISCONTINUED | OUTPATIENT
Start: 2018-08-29 | End: 2018-08-30 | Stop reason: HOSPADM

## 2018-08-29 RX ORDER — OXYCODONE HYDROCHLORIDE AND ACETAMINOPHEN 5; 325 MG/1; MG/1
2 TABLET ORAL PRN
Status: ACTIVE | OUTPATIENT
Start: 2018-08-29 | End: 2018-08-29

## 2018-08-29 RX ORDER — FENTANYL CITRATE 50 UG/ML
50 INJECTION, SOLUTION INTRAMUSCULAR; INTRAVENOUS ONCE
Status: COMPLETED | OUTPATIENT
Start: 2018-08-29 | End: 2018-08-29

## 2018-08-29 RX ORDER — MIDAZOLAM HYDROCHLORIDE 1 MG/ML
2 INJECTION INTRAMUSCULAR; INTRAVENOUS ONCE
Status: COMPLETED | OUTPATIENT
Start: 2018-08-29 | End: 2018-08-29

## 2018-08-29 RX ORDER — SODIUM CHLORIDE 9 MG/ML
INJECTION, SOLUTION INTRAVENOUS CONTINUOUS
Status: DISCONTINUED | OUTPATIENT
Start: 2018-08-29 | End: 2018-08-30 | Stop reason: HOSPADM

## 2018-08-29 RX ADMIN — SODIUM CHLORIDE: 9 INJECTION, SOLUTION INTRAVENOUS at 12:13

## 2018-08-29 RX ADMIN — ONDANSETRON 4 MG: 2 INJECTION INTRAMUSCULAR; INTRAVENOUS at 11:22

## 2018-08-29 RX ADMIN — FENTANYL CITRATE 100 MCG: 50 INJECTION, SOLUTION INTRAMUSCULAR; INTRAVENOUS at 08:45

## 2018-08-29 RX ADMIN — MIDAZOLAM HYDROCHLORIDE 2 MG: 1 INJECTION INTRAMUSCULAR; INTRAVENOUS at 08:45

## 2018-08-29 RX ADMIN — SODIUM CHLORIDE: 9 INJECTION, SOLUTION INTRAVENOUS at 07:54

## 2018-08-29 ASSESSMENT — PAIN DESCRIPTION - DESCRIPTORS: DESCRIPTORS: ACHING

## 2018-08-29 ASSESSMENT — PAIN DESCRIPTION - PAIN TYPE
TYPE: SURGICAL PAIN

## 2018-08-29 ASSESSMENT — ENCOUNTER SYMPTOMS: SHORTNESS OF BREATH: 0

## 2018-08-29 ASSESSMENT — PAIN SCALES - GENERAL
PAINLEVEL_OUTOF10: 0
PAINLEVEL_OUTOF10: 2
PAINLEVEL_OUTOF10: 1
PAINLEVEL_OUTOF10: 1
PAINLEVEL_OUTOF10: 0

## 2018-08-29 ASSESSMENT — PAIN - FUNCTIONAL ASSESSMENT: PAIN_FUNCTIONAL_ASSESSMENT: 0-10

## 2018-08-29 ASSESSMENT — LIFESTYLE VARIABLES: SMOKING_STATUS: 0

## 2018-08-29 NOTE — OP NOTE
was placed. Diseased tendon was removed, and the tendon was then tenodesed to the  proximal humerus in the subpectoral region with a 7-mm Arthrex tenodesis  screw. The deep tissue was irrigated and closed with 0 Vicryl and 2-0  Vicryl and the skin with a Monocryl. Steri-Strips, sterile dressing, and  sling were applied. He was then awoken and transported to recovery without complications. Please note Dr. Betito Kevin was essential for patient positioning, manipulation and exposure during the case.       Ilya Rice MD    D: 08/29/2018 11:48:35       T: 08/29/2018 19:40:42     [email or web address]  Job#: [id number]     Doc#: [id number]    CC:

## 2018-08-29 NOTE — ANESTHESIA POST-OP
[facility] Department of Anesthesiology  Post-Anesthesia Note       Name:  Nico Lyman                                         Age:  44 y.o.   MRN:  [id number]     Last Vitals & Oxygen Saturation: /85   Pulse 67   Temp 96.8 °F (36 °C) (Temporal)   Resp 18   Ht 5' 10\" (1.778 m)   Wt 254 lb 6.6 oz (115.4 kg)   SpO2 98%   BMI 36.50 kg/m²   Patient Vitals for the past 4 hrs:   BP Temp Temp src Pulse Resp SpO2   08/29/18 1156 - 96.8 °F (36 °C) Temporal - - -   08/29/18 1153 139/85 - - 67 18 98 %   08/29/18 1137 - - - 70 14 95 %   08/29/18 1132 131/81 - - 70 16 95 %   08/29/18 1127 136/84 97 °F (36.1 °C) Temporal 70 14 99 %   08/29/18 1122 (!) 144/85 - - 71 21 100 %   08/29/18 1115 136/84 - - 73 23 97 %   08/29/18 1112 (!) 150/86 - - 72 20 94 %   08/29/18 1109 (!) 153/84 97 °F (36.1 °C) Temporal 67 20 99 %       Level of consciousness: awake, alert and oriented    Respiratory: stable     Cardiovascular: stable     Hydration: stable     PONV: stable     Post-op pain: adequate analgesia    Post-op assessment: no apparent anesthetic complications    Complications:  none    Daniele Delgado MD  August 29, 2018   12:16 PM

## 2018-08-29 NOTE — ANESTHESIA PRE-OP
[facility] Department of Anesthesiology  Pre-Anesthesia Evaluation/Consultation       Name:  Kelsey Santana  : 1975  Age:  44 y.o.                                            MRN:  [id number]  Date: 2018           Procedure (Scheduled):  Left shoulder scope, debridement capsular release, subacromial decompression, biceps tenodesis  Surgeon:  Dr. Rodriguez Hearing      No Known Allergies  Patient Active Problem List   Diagnosis    Abdominal pain    Essential hypertension    Type 2 diabetes mellitus with hyperglycemia ([address]    Hypercholesteremia    Otalgia    Familial hyperlipidemia    Diabetes type 2, uncontrolled ([address]    Left leg pain    Sleep apnea    GERD (gastroesophageal reflux disease)    Migraine    Back pain    Morbid obesity with BMI of 40.0-44.9, adult [facility]    S/P sleeve gastrectomy    Change in bowel function     Past Medical History:   Diagnosis Date    Diabetes mellitus ([address]     GERD (gastroesophageal reflux disease)     minimal since gastric sleeve    Hyperlipidemia     Hypertension     Sleep apnea     does not use Cpap    Type II or unspecified type diabetes mellitus without mention of complication, not stated as uncontrolled     Wears glasses      Past Surgical History:   Procedure Laterality Date    DENTAL SURGERY      ENDOSCOPY, COLON, DIAGNOSTIC  2014    SLEEVE GASTRECTOMY  4/7/15    laparoscopic     Social History   Substance Use Topics    Smoking status: Former Smoker     Packs/day: 1.00     Years: 6.00     Types: Cigarettes     Quit date: 1999    Smokeless tobacco: Former User    Alcohol use No     Medications  Current Outpatient Prescriptions on File Prior to Encounter   Medication Sig Dispense Refill    Calcium-Magnesium-Vitamin D (CITRACAL CALCIUM+D PO) Take by mouth daily      atorvastatin (LIPITOR) 40 MG tablet Take 1 tablet by mouth daily 90 tablet 2    insulin glargine (LANTUS SOLOSTAR) 100 UNIT/ML injection pen 35 units daily (Patient taking differently: 35-45 Units daily Dose is based on blood sugar) 15 pen 3    lisinopril (PRINIVIL;ZESTRIL) 20 MG tablet TAKE 1 TABLET BY MOUTH EVERY DAY 90 tablet 3    omega-3 acid ethyl esters (LOVAZA) 1 g capsule Take 1 capsule by mouth daily 90 capsule 3    fenofibrate 160 MG tablet TAKE 1 TABLET BY MOUTH DAILY 90 tablet 3    metFORMIN (GLUCOPHAGE) 1000 MG tablet TAKE 1 TABLET BY MOUTH TWICE DAILY WITH MEALS (Patient taking differently: 1,000 mg daily (with breakfast) ) 180 tablet 3    oxyCODONE-acetaminophen (PERCOCET) 5-325 MG per tablet Take 1 tablet by mouth every 6 hours as needed for Pain for up to 28 doses. [facility] Date: 8/29/18 28 tablet 0    promethazine (PHENERGAN) 25 MG tablet Take 1 tablet by mouth every 6 hours as needed for Nausea 30 tablet 0    docusate sodium (COLACE) 100 MG capsule Take 1 capsule by mouth 2 times daily 60 capsule 0    Blood Glucose Monitoring Suppl (ACCU-CHEK COMPACT CARE KIT) KIT accu-chek glucose kit 1 kit 0    glucose blood VI test strips (ACCU-CHEK ACTIVE STRIPS) strip 1 each by In Vitro route 3 times daily As needed. 100 each 3    Insulin Pen Needle 32G X 4 MM MISC 1 each by Does not apply route daily 100 each 3    Insulin Pen Needle (B-D UF III MINI PEN NEEDLES) 31G X 5 MM MISC 1 each by Does not apply route daily 100 each 6    Insulin Syringes, Disposable, U-100 1 ML MISC 1 each by Does not apply route daily 3/4 inch, 31 Gauge 100 each 3    glucose blood VI test strips ([employer] TEST STRIPS) strip 1 each by In Vitro route 2 times daily. As needed. 100 each 3    Insulin Syringe-Needle U-100 (B-D INS SYRINGE 0.5CC/31GX5/16) 31G X 5/16\" 0.5 ML MISC  each 3    aspirin 81 MG EC tablet Take 81 mg by mouth daily. No current facility-administered medications on file prior to encounter.       Current Outpatient Prescriptions   Medication Sig Dispense Refill    Calcium-Magnesium-Vitamin D (CITRACAL CALCIUM+D PO) Take by mouth daily      atorvastatin Provider Last Rate Last Dose    sodium chloride flush 0.9 % injection 10 mL  10 mL Intravenous 2 times per day Caleb Loya MD        sodium chloride flush 0.9 % injection 10 mL  10 mL Intravenous PRN Caleb Loya MD        0.9 % sodium chloride infusion   Intravenous Continuous aCleb Loya  mL/hr at 18 0754      ceFAZolin (ANCEF) 2 g in dextrose 5 % 100 mL IVPB  2 g Intravenous Once Claudene Lerner, MD        fentaNYL (SUBLIMAZE) 100 MCG/2ML injection             midazolam (VERSED) 2 MG/2ML injection              Vital Signs (Current)   Vitals:    18   BP: 138/81   Pulse: 75   Resp: 14   Temp: 97 °F (36.1 °C)   SpO2: 99%     Vital Signs Statistics (for past 48 hrs)     Temp  Av °F (36.1 °C)  Min: 97 °F (36.1 °C)   Min taken time: 18  Max: 97 °F (36.1 °C)   Max taken time: 18  Pulse  Av  Min: 76   Min taken time: 18  Max: 75   Max taken time: 18  Resp  Av  Min: 15   Min taken time: 18  Max: 14   Max taken time: 18  BP  Min: 138/81   Min taken time: 18  Max: 138/81   Max taken time: 18  SpO2  Av %  Min: 99 %   Min taken time: 18  Max: 99 %   Max taken time: 18    BP Readings from Last 3 Encounters:   18 138/81   18 138/88   18 (!) 151/102     BMI  Body mass index is 36.5 kg/m². Estimated body mass index is 36.5 kg/m² as calculated from the following:    Height as of this encounter: 5' 10\" (1.778 m). Weight as of this encounter: 254 lb 6.6 oz (115.4 kg).     CBC   Lab Results   Component Value Date    WBC 3.5 2017    RBC 4.37 2017    HGB 13.5 2017    HCT 39.2 2017    MCV 89.7 2017    RDW 14.0 2017     2017     CMP    Lab Results   Component Value Date     03/15/2018    K 3.8 03/15/2018     03/15/2018    CO2 24 03/15/2018    BUN 22 03/15/2018    CREATININE 0.9 03/15/2018    GFRAA >60 03/15/2018    GFRAA >60 02/11/2013    AGRATIO 2.1 03/15/2018    LABGLOM >60 03/15/2018    GLUCOSE 94 03/15/2018    PROT 7.1 03/15/2018    PROT 7.2 02/11/2013    CALCIUM 9.6 03/15/2018    BILITOT 0.4 03/15/2018    ALKPHOS 27 03/15/2018    AST 24 03/15/2018    ALT 36 03/15/2018     BMP    Lab Results   Component Value Date     03/15/2018    K 3.8 03/15/2018     03/15/2018    CO2 24 03/15/2018    BUN 22 03/15/2018    CREATININE 0.9 03/15/2018    CALCIUM 9.6 03/15/2018    GFRAA >60 03/15/2018    GFRAA >60 02/11/2013    LABGLOM >60 03/15/2018    GLUCOSE 94 03/15/2018     POCGlucose  No results for input(s): GLUCOSE in the last 72 hours.    Coags  No results found for: PROTIME, INR, APTT  HCG (If Applicable) No results found for: PREGTESTUR, PREGSERUM, HCG, HCGQUANT   ABGs No results found for: PHART, PO2ART, SCY8DVQ, BCU6LVJ, BEART, Q9XBIDLA   Type & Screen (If Applicable)  No results found for: LABABO, LABRH                         BMI: Wt Readings from Last 3 Encounters:       NPO Status:   Date of last liquid consumption: 08/29/18   Time of last liquid consumption: 0000   Date of last solid food consumption: 08/29/18      Time of last solid consumption: 0000       Anesthesia Evaluation  Patient summary reviewed no history of anesthetic complications:   Airway: Mallampati: II  TM distance: >3 FB   Neck ROM: full  Mouth opening: > = 3 FB Dental:      Comment: No loose teeth    Pulmonary: breath sounds clear to auscultation  (+) sleep apnea:      (-) COPD, asthma, shortness of breath, recent URI and not a current smoker                           Cardiovascular:    (+) hypertension:, hyperlipidemia    (-) valvular problems/murmurs, past MI, CAD, CABG/stent, dysrhythmias,  angina,  CHF, orthopnea, PND and murmur      Rhythm: regular  Rate: normal                    Neuro/Psych:   (+) headaches:,    (-) seizures, neuromuscular disease, TIA and CVA           GI/Hepatic/Renal:   (+)

## 2018-08-29 NOTE — PROGRESS NOTES
Had ambulated to the bathroom to void. Jacinta well. Uo qs. Returned to room, sitting up in chair. Folded pillow placed under his left elbow. Sipping apple juice. Jacinta well. States feels just slightly queasy in his stomach, but tolerates po fluids ok. Likes to eat his special saltine crackers from home. Asking about when he can go home. Aware of what to start eating at home.

## 2018-08-29 NOTE — BRIEF OP NOTE
Brief Postoperative Note    Margaret Reilly  YOB: 1975  [id number]    Pre-operative Diagnosis: left shoulder slap tear, impingement, capsulitis    Post-operative Diagnosis: Same    Procedure: scope, release, hood, sad, open bieps tenodesis    Anesthesia: General    Surgeons/Assistants: MD Eliot; Sidney Burnham MD    Estimated Blood Loss: less than 50     Complications: None    Specimens: Was Not Obtained    Findings: as above    Electronically signed by Osmani Douglas MD on 8/29/2018 at 10:47 AM

## 2018-08-29 NOTE — PROGRESS NOTES
VSS. Ready to go home. IV d/c'd. Pressure drsg over site. Will be dressing to go home with wife's help. Stable.

## 2018-08-30 ENCOUNTER — HOSPITAL ENCOUNTER (OUTPATIENT)
Dept: PHYSICAL THERAPY | Age: 43
Discharge: HOME | End: 2018-08-31
Attending: ORTHOPAEDIC SURGERY | Admitting: ORTHOPAEDIC SURGERY

## 2018-08-30 ENCOUNTER — OFFICE VISIT (OUTPATIENT)
Dept: ORTHOPEDIC SURGERY | Age: 43
End: 2018-08-30

## 2018-08-30 VITALS — BODY MASS INDEX: 35.14 KG/M2 | WEIGHT: 251 LBS | HEIGHT: 71 IN

## 2018-08-30 DIAGNOSIS — M25.512 CHRONIC LEFT SHOULDER PAIN: ICD-10-CM

## 2018-08-30 DIAGNOSIS — S43.432A SUPERIOR LABRUM ANTERIOR-TO-POSTERIOR (SLAP) TEAR OF LEFT SHOULDER: ICD-10-CM

## 2018-08-30 DIAGNOSIS — M75.02 ADHESIVE CAPSULITIS OF LEFT SHOULDER: ICD-10-CM

## 2018-08-30 DIAGNOSIS — G89.29 CHRONIC LEFT SHOULDER PAIN: ICD-10-CM

## 2018-08-30 DIAGNOSIS — M75.42 SHOULDER IMPINGEMENT, LEFT: Primary | ICD-10-CM

## 2018-08-30 DIAGNOSIS — M25.512 ACUTE PAIN OF LEFT SHOULDER: ICD-10-CM

## 2018-08-30 PROCEDURE — 99024 POSTOP FOLLOW-UP VISIT: CPT | Performed by: ORTHOPAEDIC SURGERY

## 2018-08-30 NOTE — FLOWSHEET NOTE
[facility], [place]      Physical Therapy Daily Treatment Note  Date:  2018    Patient Name:  Joceline Ortega    :  1975  MRN: [id number]  Medical/Treatment Diagnosis Information:  · Diagnosis: M75.02 (ICD-10-CM) - Adhesive capsulitis of left shoulder; S43.432A (ICD-10-CM) - Superior labrum anterior-to-posterior (SLAP) tear of left shoulder; M75.42 (ICD-10-CM) - Shoulder impingement, left  · Treatment Diagnosis: M25.512 (ICD-10-CM) - Acute pain of left shoulder  Insurance/Certification information:  PT Insurance Information: Aetna  Physician Information:  Referring Practitioner: [address]  of care signed (Y/N):     Date of Patient follow up with Physician:     G-Code (if applicable):      Date G-Code Applied:    PT G-Codes 18  Functional Assessment Tool Used: UEFI  Score: 100%  Functional Limitation: Carrying, moving and handling objects  Carrying, Moving and Handling Objects Current Status (): 100 percent impaired, limited or restricted  Carrying, Moving and Handling Objects Goal Status ():  At least 1 percent but less than 20 percent impaired, limited or restricted    Progress Note: [x]  Yes  []  No  Next due by: Visit #10      Latex Allergy:  [x]NO      []YES   Preferred Language for Healthcare:   [x]English       []other:    Visit # Insurance Allowable   1 MN     Pain level:  8/10     SUBJECTIVE:  See eval    OBJECTIVE: See eval  Observation:   Test measurements:      ROM PROM AROM  Comment    L R L R    Flexion        Abduction        ER        IR        Other        Other             Strength L R Comment   Flexion      Abduction      ER      IR      Supraspinatus      Upper Trap      Lower Trap      Mid Trap      Rhomboids      Biceps      Triceps      Horizontal Abduction      Horizontal Adduction      Lats          RESTRICTIONS/PRECAUTIONS: see PT RX in media       Exercises:  Exercise/Equipment Resistance Repetitions Other comments Stretching/PROM      Wand      Table Slides  Flexion 10x10    UE Gridley      Pulleys      Pendulum      PROM Elbow  10x10    Isometrics      Retraction        3'    Weight shift      Flexion      Abduction      External Rotation      Internal Rotation      Biceps      Triceps            PRE's      Flexion      Abduction      External Rotation      Internal Rotation      Shrugs      EXT      Reverse Flys      Serratus      Horizontal Abd with ER      Biceps      Triceps      Retraction            Cable Column/Theraband      External Rotation      Internal Rotation      Shrugs      Lats      Ext      Flex      Scapular Retraction      BIC      TRIC      PNF            Dynamic Stability            Plyoback                Therapeutic Exercise and NMR EXR  [] (69809) Provided verbal/tactile cueing for activities related to strengthening, flexibility, endurance, ROM  for improvements in scapular, scapulothoracic and UE control with self care, reaching, carrying, lifting, house/yardwork, driving/computer work.    [] (35611) Provided verbal/tactile cueing for activities related to improving balance, coordination, kinesthetic sense, posture, motor skill, proprioception  to assist with  scapular, scapulothoracic and UE control with self care, reaching, carrying, lifting, house/yardwork, driving/computer work. Therapeutic Activities:    [] (59626 or 53588) Provided verbal/tactile cueing for activities related to improving balance, coordination, kinesthetic sense, posture, motor skill, proprioception and motor activation to allow for proper function of scapular, scapulothoracic and UE control with self care, carrying, lifting, driving/computer work.      Home Exercise Program:    [x] (02425) Reviewed/Progressed HEP activities related to strengthening, flexibility, endurance, ROM of scapular, scapulothoracic and UE control with self care, reaching, carrying, lifting, house/yardwork, driving/computer work  [] (03879) Reviewed/Progressed HEP activities related to improving balance, coordination, kinesthetic sense, posture, motor skill, proprioception of scapular, scapulothoracic and UE control with self care, reaching, carrying, lifting, house/yardwork, driving/computer work      Manual Treatments:  PROM / STM / Oscillations-Mobs:  G-I, II, III, IV (PA's, Inf., Post.)  [] (33528) Provided manual therapy to mobilize soft tissue/joints of cervical/CT, scapular GHJ and UE for the purpose of modulating pain, promoting relaxation,  increasing ROM, reducing/eliminating soft tissue swelling/inflammation/restriction, improving soft tissue extensibility and allowing for proper ROM for normal function with self care, reaching, carrying, lifting, house/yardwork, driving/computer work    Modalities:      Charges:  Timed Code Treatment Minutes: 29   Total Treatment Minutes: 50     [x] EVAL (LOW) 87147   [] EVAL (MOD) 94363   [] EVAL (HIGH) 23926   [] RE-EVAL   [x] IB(72117) x  1   [] IONTO  [] NMR (14517) x      [] VASO  [] Manual (79972) x       [] Other:  [x] TA x  1    [] Mech Traction (14538)  [] ES(attended) (71458)      [] ES (un) (31542):       GOALS:  Patient stated goal: to get back to daily activities without pain      Therapist goals for Patient:   Short Term Goals: To be achieved in: 2 weeks  1. Independent in HEP and progression per patient tolerance, in order to prevent re-injury. 2. Patient will have a decrease in pain to facilitate improvement in movement, function, and ADLs as indicated by Functional Deficits.     Long Term Goals: To be achieved in: 6-8 weeks  1. Disability index score of 19% or less for the UEFI to assist with reaching prior level of function. 2. Patient will demonstrate increased AROM to 150+ flex/abduction, 70+ ER, 50+ IR to allow for proper joint functioning as indicated by patients Functional Deficits.    3. Patient will demonstrate an increase in Strength to 4+/5 or greater to allow for proper

## 2018-08-30 NOTE — PLAN OF CARE
factors: pain medication   Provocative factors: movement of shoulder     Type: [x]Constant   []Intermittent  []Radiating []Localized []other:     Numbness/Tingling: pt denies    Functional Limitations/Impairments: []Lifting/reaching []Grooming []Carrying    []ADL's []Driving []Sports/Recreations   []Other:    Occupation/School:     Living Status/Prior Level of Function: Independent with ADLs and IADLs,     OBJECTIVE:       Joint mobility:   []Normal    [x]Hypo   []Hyper    Palpation: TTP near incisions     Functional Mobility/Transfers:     Posture: protective posturing without sling     Bandages/Dressings/Incisions:  Bandages were removed and steri-strips were inspected. Shoulder was cleaned with rubbing alcohol and appropriate dressing applied. Gait: (include devices/WB status) wfl      Palpation Scale- Lori and Bea- (Grade 0-4)     Description X / -- Comments   Grade 0 No tenderness     Grade 1 Mild tenderness without grimace or flinch     Grade 2  Moderate tenderness plus grimace or flinch     Grade 3  Severe tenderness plus marked flinch or withdrawal X    Grade 4 Unbearable tenderness; patient withdrawals with light touch      DR Lori, Melba Speaker GM. Myofascial trigger points show spontaneous needle emg activity. Spine 1993; 18 :[id number]. [x] Patient history, allergies, meds reviewed. Medical chart reviewed. See intake form. Review Of Systems (ROS):  [x]Performed Review of systems (Integumentary, CardioPulmonary, Neurological) by intake and observation. Intake form has been scanned into medical record. Patient has been instructed to contact their primary care physician regarding ROS issues if not already being addressed at this time.       Co-morbidities/Complexities (which will affect course of rehabilitation):   []None           Arthritic conditions   []Rheumatoid arthritis (M05.9)  []Osteoarthritis (M19.91)   Cardiovascular conditions

## 2018-09-01 ENCOUNTER — HOSPITAL ENCOUNTER (OUTPATIENT)
Dept: OTHER | Age: 43
Discharge: HOME | End: 2018-09-01
Attending: ORTHOPAEDIC SURGERY | Admitting: ORTHOPAEDIC SURGERY

## 2018-09-06 ENCOUNTER — OFFICE VISIT (OUTPATIENT)
Dept: ORTHOPEDIC SURGERY | Age: 43
End: 2018-09-06

## 2018-09-06 ENCOUNTER — HOSPITAL ENCOUNTER (OUTPATIENT)
Dept: PHYSICAL THERAPY | Age: 43
Discharge: HOME | End: 2018-09-07
Admitting: ORTHOPAEDIC SURGERY

## 2018-09-06 VITALS — BODY MASS INDEX: 35.14 KG/M2 | HEIGHT: 71 IN | WEIGHT: 251 LBS

## 2018-09-06 DIAGNOSIS — M75.42 SHOULDER IMPINGEMENT, LEFT: Primary | ICD-10-CM

## 2018-09-06 DIAGNOSIS — M75.02 ADHESIVE CAPSULITIS OF LEFT SHOULDER: ICD-10-CM

## 2018-09-06 DIAGNOSIS — M25.512 CHRONIC LEFT SHOULDER PAIN: ICD-10-CM

## 2018-09-06 DIAGNOSIS — S43.432A SUPERIOR LABRUM ANTERIOR-TO-POSTERIOR (SLAP) TEAR OF LEFT SHOULDER: ICD-10-CM

## 2018-09-06 DIAGNOSIS — G89.29 CHRONIC LEFT SHOULDER PAIN: ICD-10-CM

## 2018-09-06 PROCEDURE — 99024 POSTOP FOLLOW-UP VISIT: CPT | Performed by: ORTHOPAEDIC SURGERY

## 2018-09-12 ENCOUNTER — HOSPITAL ENCOUNTER (OUTPATIENT)
Dept: PHYSICAL THERAPY | Age: 43
Discharge: HOME | End: 2018-09-13
Admitting: ORTHOPAEDIC SURGERY

## 2018-09-12 NOTE — FLOWSHEET NOTE
[facility], [place]      Physical Therapy Daily Treatment Note  Date:  2018    Patient Name:  Romina Quintanilla    :  1975  MRN: [id number]  Medical/Treatment Diagnosis Information:  · Diagnosis: M75.02 (ICD-10-CM) - Adhesive capsulitis of left shoulder; S43.432A (ICD-10-CM) - Superior labrum anterior-to-posterior (SLAP) tear of left shoulder; M75.42 (ICD-10-CM) - Shoulder impingement, left  · Treatment Diagnosis: M25.512 (ICD-10-CM) - Acute pain of left shoulder  Insurance/Certification information:  PT Insurance Information: Aetna  Physician Information:  Referring Practitioner: [address]  of care signed (Y/N):     Date of Patient follow up with Physician:     G-Code (if applicable):      Date G-Code Applied:    PT G-Codes 18  Functional Assessment Tool Used: UEFI  Score: 100%  Functional Limitation: Carrying, moving and handling objects  Carrying, Moving and Handling Objects Current Status (): 100 percent impaired, limited or restricted  Carrying, Moving and Handling Objects Goal Status (): At least 1 percent but less than 20 percent impaired, limited or restricted    Progress Note: [x]  Yes  []  No  Next due by: Visit #10      Latex Allergy:  [x]NO      []YES   Preferred Language for Healthcare:   [x]English       []other:    Visit # Insurance Allowable   3 MN     Pain level:  0/10     SUBJECTIVE: Pt is 2 weeks sp. pt notes he has no pain at all, he was able to sleep in bed without issue.      OBJECTIVE: 18   Observation:   Test measurements:      ROM PROM AROM  Comment    L R L R    Flexion 153 pulleys        Abduction 162 pulleys        ER 45 wand supine       IR        Other        Other             Strength L R Comment   Flexion      Abduction      ER      IR      Supraspinatus      Upper Trap      Lower Trap      Mid Trap      Rhomboids      Biceps      Triceps      Horizontal Abduction      Horizontal Adduction      Lats RESTRICTIONS/PRECAUTIONS: Immediate full ROM, sling x4 weeks, no resisted biceps for 4 weeks. see PT RX in media       Exercises:  Exercise/Equipment Resistance Repetitions Other comments   Stretching/PROM      Wand  ER 10x10 supine  Flexion 10x10 supine     Table Slides  Flexion; scaption; abduction 10x10    UE Sabael      Pulleys  Flexion; scaption; abduction 10x10    Pendulum      PROM Elbow  10x10    Isometrics      Retraction        3'    Weight shift      Flexion      Abduction      External Rotation      Internal Rotation      Biceps      Triceps            PRE's      Flexion      Abduction      External Rotation      Internal Rotation      Shrugs  3x10    EXT      Reverse Flys      Serratus  3x10     Horizontal Abd with ER      Biceps      Triceps      Retraction  3x10          Cable Column/Theraband      External Rotation      Internal Rotation      Shrugs      Lats      Ext      Flex      Scapular Retraction      BIC      TRIC      PNF            Dynamic Stability            Plyoback                Therapeutic Exercise and NMR EXR  [] (83609) Provided verbal/tactile cueing for activities related to strengthening, flexibility, endurance, ROM  for improvements in scapular, scapulothoracic and UE control with self care, reaching, carrying, lifting, house/yardwork, driving/computer work.    [] (27464) Provided verbal/tactile cueing for activities related to improving balance, coordination, kinesthetic sense, posture, motor skill, proprioception  to assist with  scapular, scapulothoracic and UE control with self care, reaching, carrying, lifting, house/yardwork, driving/computer work.     Therapeutic Activities:    [] (11911 or 57044) Provided verbal/tactile cueing for activities related to improving balance, coordination, kinesthetic sense, posture, motor skill, proprioception and motor activation to allow for proper function of scapular, scapulothoracic and UE control with self care, carrying, lifting, driving/computer work. Home Exercise Program:    [x] (18014) Reviewed/Progressed HEP activities related to strengthening, flexibility, endurance, ROM of scapular, scapulothoracic and UE control with self care, reaching, carrying, lifting, house/yardwork, driving/computer work  [] (25796) Reviewed/Progressed HEP activities related to improving balance, coordination, kinesthetic sense, posture, motor skill, proprioception of scapular, scapulothoracic and UE control with self care, reaching, carrying, lifting, house/yardwork, driving/computer work      Manual Treatments:  PROM / STM / Oscillations-Mobs:  G-I, II, III, IV (PA's, Inf., Post.)  [] (01509) Provided manual therapy to mobilize soft tissue/joints of cervical/CT, scapular GHJ and UE for the purpose of modulating pain, promoting relaxation,  increasing ROM, reducing/eliminating soft tissue swelling/inflammation/restriction, improving soft tissue extensibility and allowing for proper ROM for normal function with self care, reaching, carrying, lifting, house/yardwork, driving/computer work    Modalities:      Charges:  Timed Code Treatment Minutes: 39   Total Treatment Minutes: 50     [] EVAL (LOW) 46284   [] EVAL (MOD) 37353   [] EVAL (HIGH) 69942   [] RE-EVAL   [x] ZU(51120) x  2   [] IONTO  [] NMR (72341) x      [] VASO  [] Manual (05274) x       [] Other:  [x] TA x  1    [] Mech Traction (84981)  [] ES(attended) (52033)      [] ES (un) (01790):       GOALS:  Patient stated goal: to get back to daily activities without pain      Therapist goals for Patient:   Short Term Goals: To be achieved in: 2 weeks  1. Independent in HEP and progression per patient tolerance, in order to prevent re-injury. 2. Patient will have a decrease in pain to facilitate improvement in movement, function, and ADLs as indicated by Functional Deficits.     Long Term Goals: To be achieved in: 6-8 weeks  1.  Disability index score of 19% or less for the UEFI to assist with

## 2018-09-13 DIAGNOSIS — M75.02 ADHESIVE CAPSULITIS OF LEFT SHOULDER: ICD-10-CM

## 2018-09-13 PROCEDURE — MISCPULLYB&C PULLY'S B&C: Performed by: ORTHOPAEDIC SURGERY

## 2018-09-20 ENCOUNTER — HOSPITAL ENCOUNTER (OUTPATIENT)
Dept: PHYSICAL THERAPY | Age: 43
Discharge: HOME | End: 2018-09-21
Admitting: ORTHOPAEDIC SURGERY

## 2018-09-20 NOTE — FLOWSHEET NOTE
UEFI to assist with reaching prior level of function. 2. Patient will demonstrate increased AROM to 150+ flex/abduction, 70+ ER, 50+ IR to allow for proper joint functioning as indicated by patients Functional Deficits. 3. Patient will demonstrate an increase in Strength to 4+/5 or greater to allow for proper functional mobility as indicated by patients Functional Deficits. 4. Patient will return to overhead ADLs without increased symptoms or restriction. Progression Towards Functional goals:  [] Patient is progressing as expected towards functional goals listed. [] Progression is slowed due to complexities listed. [] Progression has been slowed due to co-morbidities. [x] Plan just implemented, too soon to assess goals progression  [] Other:     ASSESSMENT:      Treatment/Activity Tolerance:  [] Patient tolerated treatment well [] Patient limited by fatique  [] Patient limited by pain  [] Patient limited by other medical complications  [] Other: Pt shows improved ROM today, able to progress to IR. He was able to add isometrics today with no pain. Avoided flexion d/t bicep precautions. Pt to see MD next week, continue with POC as advised.      Prognosis: [] Good [] Fair  [] Poor    Patient Requires Follow-up: [x] Yes  [] No    PLAN:   [] Continue per plan of care [] Alter current plan (see comments)  [x] Plan of care initiated [] Hold pending MD visit [] Discharge    Electronically signed by:  Too Ruiz, PT, DPT, Cert DN

## 2018-09-27 ENCOUNTER — OFFICE VISIT (OUTPATIENT)
Dept: ORTHOPEDIC SURGERY | Age: 43
End: 2018-09-27

## 2018-09-27 ENCOUNTER — HOSPITAL ENCOUNTER (OUTPATIENT)
Dept: PHYSICAL THERAPY | Age: 43
Setting detail: THERAPIES SERIES
Discharge: HOME | End: 2018-09-27
Payer: COMMERCIAL

## 2018-09-27 VITALS
SYSTOLIC BLOOD PRESSURE: 135 MMHG | WEIGHT: 247 LBS | BODY MASS INDEX: 34.58 KG/M2 | HEIGHT: 71 IN | DIASTOLIC BLOOD PRESSURE: 93 MMHG | HEART RATE: 85 BPM

## 2018-09-27 DIAGNOSIS — G89.29 CHRONIC LEFT SHOULDER PAIN: ICD-10-CM

## 2018-09-27 DIAGNOSIS — M75.42 SHOULDER IMPINGEMENT, LEFT: ICD-10-CM

## 2018-09-27 DIAGNOSIS — M75.02 ADHESIVE CAPSULITIS OF LEFT SHOULDER: Primary | ICD-10-CM

## 2018-09-27 DIAGNOSIS — S43.432A SUPERIOR LABRUM ANTERIOR-TO-POSTERIOR (SLAP) TEAR OF LEFT SHOULDER: ICD-10-CM

## 2018-09-27 DIAGNOSIS — M25.512 CHRONIC LEFT SHOULDER PAIN: ICD-10-CM

## 2018-09-27 PROCEDURE — 97530 THERAPEUTIC ACTIVITIES: CPT

## 2018-09-27 PROCEDURE — 99024 POSTOP FOLLOW-UP VISIT: CPT | Performed by: ORTHOPAEDIC SURGERY

## 2018-09-27 PROCEDURE — 97110 THERAPEUTIC EXERCISES: CPT

## 2018-09-27 NOTE — PROGRESS NOTES
Nico Lyman returns today month out from left shoulder arthroscopy decompression and capsular release and biceps tenodesis. He is doing very well. Typically he is pain-free. He has not required any anti-inflammatory or pain medicine since postop week 1. He says that after he stretches he'll have some soreness for maybe 15-30 minutes but resolves quickly. At worst his pain is 2 or 3 out of 10 after doing exercises. Today for elevation is 170°. External rotation 60°. Strength is 4+ over 5 in all planes. He has no tenderness along the biceps. His incision is healing up. He has no pain stressing the biceps or supination or flexion. Assessment: Doing well status post left shoulder surgery. Plan: He can discontinue sling. Continue with PT. Follow-up with me in 6 weeks. This note was created using voice recognition software. It has been proofread, but occasionally errors remain. Please disregard these errors. They will be corrected as they are noted.

## 2018-09-27 NOTE — FLOWSHEET NOTE
[facility], [place]      Physical Therapy Daily Treatment Note  Date:  2018    Patient Name:  Josie Parks    :  1975  MRN: [id number]  Medical/Treatment Diagnosis Information:  · Diagnosis: M75.02 (ICD-10-CM) - Adhesive capsulitis of left shoulder; S43.432A (ICD-10-CM) - Superior labrum anterior-to-posterior (SLAP) tear of left shoulder; M75.42 (ICD-10-CM) - Shoulder impingement, left  · Treatment Diagnosis: M25.512 (ICD-10-CM) - Acute pain of left shoulder  Insurance/Certification information:  PT Insurance Information: Aetna  Physician Information:  Referring Practitioner: [address]  of care signed (Y/N):     Date of Patient follow up with Physician:     G-Code (if applicable):      Date G-Code Applied:    PT G-Codes 18  Functional Assessment Tool Used: UEFI  Score: 100%  Functional Limitation: Carrying, moving and handling objects  Carrying, Moving and Handling Objects Current Status (): 100 percent impaired, limited or restricted  Carrying, Moving and Handling Objects Goal Status (): At least 1 percent but less than 20 percent impaired, limited or restricted    Progress Note: [x]  Yes  []  No  Next due by: Visit #10      Latex Allergy:  [x]NO      []YES   Preferred Language for Healthcare:   [x]English       []other:    Visit # Insurance Allowable   5 MN     Pain level:  0/10     SUBJECTIVE: Pt is 4 weeks sp. He saw MD this morning who notes he is doing well. He notes that he only has some mild discomfort with doing stretches but overall ADLs are going well.      OBJECTIVE: 18   Observation:   Test measurements:      ROM PROM AROM  Comment    L R L R    Flexion   165  Supine    Abduction   170     ER   85     IR        Other        Other             Strength L R Comment   Flexion      Abduction      ER      IR      Supraspinatus      Upper Trap      Lower Trap      Mid Trap      Rhomboids      Biceps      Triceps      Horizontal Abduction function. 2. Patient will demonstrate increased AROM to 150+ flex/abduction, 70+ ER, 50+ IR to allow for proper joint functioning as indicated by patients Functional Deficits. 3. Patient will demonstrate an increase in Strength to 4+/5 or greater to allow for proper functional mobility as indicated by patients Functional Deficits. 4. Patient will return to overhead ADLs without increased symptoms or restriction. Progression Towards Functional goals:  [] Patient is progressing as expected towards functional goals listed. [] Progression is slowed due to complexities listed. [] Progression has been slowed due to co-morbidities. [x] Plan just implemented, too soon to assess goals progression  [] Other:     ASSESSMENT:      Treatment/Activity Tolerance:  [] Patient tolerated treatment well [] Patient limited by fatique  [] Patient limited by pain  [] Patient limited by other medical complications  [] Other: Pt shows improved ROM in all planes. He was able to increase PREs today without pain. ER is most difficult stretch for him. Continue to progress as tolerated.      Prognosis: [] Good [] Fair  [] Poor    Patient Requires Follow-up: [x] Yes  [] No    PLAN:   [] Continue per plan of care [] Alter current plan (see comments)  [x] Plan of care initiated [] Hold pending MD visit [] Discharge    Electronically signed by:  Tomas Diaz, PT, DPT, Cert BANG

## 2018-10-01 ENCOUNTER — TELEPHONE (OUTPATIENT)
Dept: PHYSICAL THERAPY | Age: 43
End: 2018-10-01

## 2018-10-05 ENCOUNTER — HOSPITAL ENCOUNTER (OUTPATIENT)
Dept: PHYSICAL THERAPY | Age: 43
Setting detail: THERAPIES SERIES
Discharge: HOME | End: 2018-10-05
Payer: COMMERCIAL

## 2018-10-05 PROCEDURE — 97530 THERAPEUTIC ACTIVITIES: CPT

## 2018-10-05 PROCEDURE — 97110 THERAPEUTIC EXERCISES: CPT

## 2018-10-05 NOTE — FLOWSHEET NOTE
HEP activities related to strengthening, flexibility, endurance, ROM of scapular, scapulothoracic and UE control with self care, reaching, carrying, lifting, house/yardwork, driving/computer work  [] (88627) Reviewed/Progressed HEP activities related to improving balance, coordination, kinesthetic sense, posture, motor skill, proprioception of scapular, scapulothoracic and UE control with self care, reaching, carrying, lifting, house/yardwork, driving/computer work      Manual Treatments:  PROM / STM / Oscillations-Mobs:  G-I, II, III, IV (PA's, Inf., Post.)  [] (66126) Provided manual therapy to mobilize soft tissue/joints of cervical/CT, scapular GHJ and UE for the purpose of modulating pain, promoting relaxation,  increasing ROM, reducing/eliminating soft tissue swelling/inflammation/restriction, improving soft tissue extensibility and allowing for proper ROM for normal function with self care, reaching, carrying, lifting, house/yardwork, driving/computer work    Modalities:      Charges:  Timed Code Treatment Minutes: 54   Total Treatment Minutes: 59     [] EVAL (LOW) 01282   [] EVAL (MOD) 08201   [] EVAL (HIGH) 58502   [] RE-EVAL   [x] ST(93249) x  2   [] IONTO  [] NMR (71363) x      [] VASO  [] Manual (00932) x       [] Other:  [x] TA x  2    [] Mech Traction (04379)  [] ES(attended) (77460)      [] ES (un) (12979):       GOALS:  Patient stated goal: to get back to daily activities without pain      Therapist goals for Patient:   Short Term Goals: To be achieved in: 2 weeks  1. Independent in HEP and progression per patient tolerance, in order to prevent re-injury. 2. Patient will have a decrease in pain to facilitate improvement in movement, function, and ADLs as indicated by Functional Deficits.     Long Term Goals: To be achieved in: 6-8 weeks  1. Disability index score of 19% or less for the UEFI to assist with reaching prior level of function.    2. Patient will demonstrate increased AROM to 150+ flex/abduction, 70+ ER, 50+ IR to allow for proper joint functioning as indicated by patients Functional Deficits. 3. Patient will demonstrate an increase in Strength to 4+/5 or greater to allow for proper functional mobility as indicated by patients Functional Deficits. 4. Patient will return to overhead ADLs without increased symptoms or restriction. Progression Towards Functional goals:  [] Patient is progressing as expected towards functional goals listed. [] Progression is slowed due to complexities listed. [] Progression has been slowed due to co-morbidities. [x] Plan just implemented, too soon to assess goals progression  [] Other:     ASSESSMENT:      Treatment/Activity Tolerance:  [] Patient tolerated treatment well [] Patient limited by fatique  [] Patient limited by pain  [] Patient limited by other medical complications  [] Other: Pt able to progress strengthening today without issue. Continue as tolerated.      Prognosis: [] Good [] Fair  [] Poor    Patient Requires Follow-up: [x] Yes  [] No    PLAN:   [] Continue per plan of care [] Alter current plan (see comments)  [x] Plan of care initiated [] Hold pending MD visit [] Discharge    Electronically signed by:  Maricruz Duron, PT, DPT, Cert DN

## 2018-10-11 ENCOUNTER — HOSPITAL ENCOUNTER (OUTPATIENT)
Dept: PHYSICAL THERAPY | Age: 43
Setting detail: THERAPIES SERIES
Discharge: HOME | End: 2018-10-11
Payer: COMMERCIAL

## 2018-10-11 PROCEDURE — 97530 THERAPEUTIC ACTIVITIES: CPT

## 2018-10-11 PROCEDURE — 97110 THERAPEUTIC EXERCISES: CPT

## 2018-10-11 NOTE — FLOWSHEET NOTE
[facility], [place]      Physical Therapy Daily Treatment Note  Date:  10/11/2018    Patient Name:  Mallory Espinoza    :  1975  MRN: [id number]  Medical/Treatment Diagnosis Information:  · Diagnosis: M75.02 (ICD-10-CM) - Adhesive capsulitis of left shoulder; S43.432A (ICD-10-CM) - Superior labrum anterior-to-posterior (SLAP) tear of left shoulder; M75.42 (ICD-10-CM) - Shoulder impingement, left  · Treatment Diagnosis: M25.512 (ICD-10-CM) - Acute pain of left shoulder  Insurance/Certification information:  PT Insurance Information: Aetna  Physician Information:  Referring Practitioner: Iman Rawls of care signed (Y/N):     Date of Patient follow up with Physician:     G-Code (if applicable):      Date G-Code Applied:    PT G-Codes 18  Functional Assessment Tool Used: UEFI  Score: 100%  Functional Limitation: Carrying, moving and handling objects  Carrying, Moving and Handling Objects Current Status (): 100 percent impaired, limited or restricted  Carrying, Moving and Handling Objects Goal Status (): At least 1 percent but less than 20 percent impaired, limited or restricted    Progress Note: [x]  Yes  []  No  Next due by: Visit #10      Latex Allergy:  [x]NO      []YES   Preferred Language for Healthcare:   [x]English       []other:    Visit # Insurance Allowable   7 MN     Pain level:  0/10     SUBJECTIVE: Pt is 6 weeks sp. Pt notes he has an ache in his shoulder like he's worked out. He notes that he has been doing all of HEP 2-4x/day.        OBJECTIVE: 10/05/18  Observation:   Test measurements:      ROM PROM AROM  Comment    L R L R    Flexion   165  Supine    Abduction   170     ER   85     IR        Other        Other             Strength L R Comment   Flexion      Abduction      ER      IR      Supraspinatus      Upper Trap      Lower Trap      Mid Trap      Rhomboids      Biceps      Triceps      Horizontal Abduction      Horizontal Adduction      Lats

## 2018-10-18 ENCOUNTER — HOSPITAL ENCOUNTER (OUTPATIENT)
Dept: PHYSICAL THERAPY | Age: 43
Setting detail: THERAPIES SERIES
Discharge: HOME | End: 2018-10-18
Payer: COMMERCIAL

## 2018-10-18 NOTE — FLOWSHEET NOTE
[facility]     [facility]  Cancellation/No-show Note  Patient Name:  Ethan Campos  :  1975   Date:  10/18/2018  Cancelled visits to date: 1  No-shows to date: 0    For today's appointment patient:  [x]  Cancelled  []  Rescheduled appointment  []  No-show     Reason given by patient:  [x]  Patient ill  []  Conflicting appointment  []  No transportation    []  Conflict with work  []  No reason given  []  Other:     Comments:      Electronically signed by:  Pranav Knight PT

## 2018-11-03 RX ORDER — OMEGA-3-ACID ETHYL ESTERS 1 G/1
CAPSULE, LIQUID FILLED ORAL
Qty: 90 CAPSULE | Refills: 3 | Status: SHIPPED | OUTPATIENT
Start: 2018-11-03 | End: 2019-10-18

## 2018-11-03 RX ORDER — LISINOPRIL 20 MG/1
TABLET ORAL
Qty: 90 TABLET | Refills: 3 | Status: SHIPPED | OUTPATIENT
Start: 2018-11-03 | End: 2019-10-18

## 2018-11-03 RX ORDER — OMEPRAZOLE 40 MG/1
CAPSULE, DELAYED RELEASE ORAL
Qty: 90 CAPSULE | Refills: 3 | Status: SHIPPED | OUTPATIENT
Start: 2018-11-03 | End: 2020-02-27

## 2018-11-06 ENCOUNTER — OFFICE VISIT (OUTPATIENT)
Dept: ORTHOPEDIC SURGERY | Age: 43
End: 2018-11-06

## 2018-11-06 VITALS — WEIGHT: 247 LBS | BODY MASS INDEX: 34.58 KG/M2 | RESPIRATION RATE: 12 BRPM | HEIGHT: 71 IN

## 2018-11-06 DIAGNOSIS — G89.29 CHRONIC LEFT SHOULDER PAIN: ICD-10-CM

## 2018-11-06 DIAGNOSIS — M75.42 SHOULDER IMPINGEMENT, LEFT: ICD-10-CM

## 2018-11-06 DIAGNOSIS — M77.11 RIGHT TENNIS ELBOW: ICD-10-CM

## 2018-11-06 DIAGNOSIS — M25.521 RIGHT ELBOW PAIN: ICD-10-CM

## 2018-11-06 DIAGNOSIS — M25.512 CHRONIC LEFT SHOULDER PAIN: ICD-10-CM

## 2018-11-06 DIAGNOSIS — M75.02 ADHESIVE CAPSULITIS OF LEFT SHOULDER: Primary | ICD-10-CM

## 2018-11-06 PROCEDURE — 99024 POSTOP FOLLOW-UP VISIT: CPT | Performed by: ORTHOPAEDIC SURGERY

## 2018-11-15 ENCOUNTER — OFFICE VISIT (OUTPATIENT)
Dept: ENDOCRINOLOGY | Age: 43
End: 2018-11-15
Payer: COMMERCIAL

## 2018-11-15 VITALS
BODY MASS INDEX: 37.71 KG/M2 | OXYGEN SATURATION: 99 % | HEART RATE: 80 BPM | HEIGHT: 70 IN | DIASTOLIC BLOOD PRESSURE: 78 MMHG | WEIGHT: 263.4 LBS | RESPIRATION RATE: 16 BRPM | SYSTOLIC BLOOD PRESSURE: 128 MMHG

## 2018-11-15 DIAGNOSIS — E11.65 TYPE 2 DIABETES MELLITUS WITH HYPERGLYCEMIA, WITH LONG-TERM CURRENT USE OF INSULIN (HCC): Primary | ICD-10-CM

## 2018-11-15 DIAGNOSIS — Z79.4 TYPE 2 DIABETES MELLITUS WITH HYPERGLYCEMIA, WITH LONG-TERM CURRENT USE OF INSULIN (HCC): Primary | ICD-10-CM

## 2018-11-15 DIAGNOSIS — I10 ESSENTIAL HYPERTENSION: ICD-10-CM

## 2018-11-15 DIAGNOSIS — E78.00 HYPERCHOLESTEREMIA: ICD-10-CM

## 2018-11-15 LAB — HBA1C MFR BLD: 8.4 %

## 2018-11-15 PROCEDURE — 99214 OFFICE O/P EST MOD 30 MIN: CPT | Performed by: INTERNAL MEDICINE

## 2018-11-15 PROCEDURE — 83036 HEMOGLOBIN GLYCOSYLATED A1C: CPT | Performed by: INTERNAL MEDICINE

## 2018-11-15 NOTE — PROGRESS NOTES
advise loss    Plan:      Lantus 35 units daily---> 45 units   Add Humalog SSI if blood glucose persistently high >200   Avoid GLP given pancreatitis history   Advise to check blood sugar 2 times a day   Patient to send blood sugar log for titration. Advise to exercise regularly. Advise to low simple carbohydrate and protein with each  meal diet. Diabetes Care: recommend yearly eye exam, foot exam and urine microalbumin to   creatinine ratio.  Patient is due    -Hyperlipidemia, LDL goal is <100 mg/dl    Once off insulin, will see PCP only

## 2018-11-26 RX ORDER — ATORVASTATIN CALCIUM 40 MG/1
TABLET, FILM COATED ORAL
Qty: 90 TABLET | Refills: 2 | Status: SHIPPED | OUTPATIENT
Start: 2018-11-26 | End: 2019-09-05 | Stop reason: SDUPTHER

## 2018-12-14 RX ORDER — FENOFIBRATE 160 MG/1
TABLET ORAL
Qty: 90 TABLET | Refills: 3 | Status: SHIPPED | OUTPATIENT
Start: 2018-12-14 | End: 2019-11-20 | Stop reason: SDUPTHER

## 2018-12-18 ENCOUNTER — OFFICE VISIT (OUTPATIENT)
Dept: ORTHOPEDIC SURGERY | Age: 43
End: 2018-12-18
Payer: COMMERCIAL

## 2018-12-18 VITALS
DIASTOLIC BLOOD PRESSURE: 84 MMHG | WEIGHT: 254 LBS | BODY MASS INDEX: 35.56 KG/M2 | SYSTOLIC BLOOD PRESSURE: 143 MMHG | HEART RATE: 81 BPM | HEIGHT: 71 IN

## 2018-12-18 DIAGNOSIS — M25.521 RIGHT ELBOW PAIN: Primary | ICD-10-CM

## 2018-12-18 DIAGNOSIS — M77.11 RIGHT TENNIS ELBOW: ICD-10-CM

## 2018-12-18 PROCEDURE — 99214 OFFICE O/P EST MOD 30 MIN: CPT | Performed by: ORTHOPAEDIC SURGERY

## 2018-12-18 PROCEDURE — 20551 NJX 1 TENDON ORIGIN/INSJ: CPT | Performed by: ORTHOPAEDIC SURGERY

## 2018-12-18 RX ORDER — METHYLPREDNISOLONE ACETATE 40 MG/ML
80 INJECTION, SUSPENSION INTRA-ARTICULAR; INTRALESIONAL; INTRAMUSCULAR; SOFT TISSUE ONCE
Status: COMPLETED | OUTPATIENT
Start: 2018-12-18 | End: 2018-12-18

## 2018-12-18 RX ADMIN — METHYLPREDNISOLONE ACETATE 80 MG: 40 INJECTION, SUSPENSION INTRA-ARTICULAR; INTRALESIONAL; INTRAMUSCULAR; SOFT TISSUE at 15:12

## 2018-12-18 ASSESSMENT — ENCOUNTER SYMPTOMS
EYES NEGATIVE: 1
GASTROINTESTINAL NEGATIVE: 1
RESPIRATORY NEGATIVE: 1
ALLERGIC/IMMUNOLOGIC NEGATIVE: 1

## 2018-12-18 NOTE — PROGRESS NOTES
Subjective:      Patient ID: Laurie Guadalupe is a 44 y.o. male. HPI  Laurie Guadalupe returns today for his right elbow. He was tentatively diagnosed with tennis elbow his last visit. He's been wearing a wrist brace and doing exercises which have been only marginally helpful. He's taken ibuprofen a few times. Pain ranges from 4-7 out of 10. His pain is especially bad with lifting and gripping. He denies numbness or tingling. Starts at the elbow and radiates toward the wrist.  He is right-hand dominant. He denies trauma. Review of Systems   Constitutional: Negative. HENT: Negative. Eyes: Negative. Respiratory: Negative. Cardiovascular: Negative. Gastrointestinal: Negative. Endocrine: Negative. Genitourinary: Negative. Musculoskeletal: Positive for arthralgias. Right elbow pain   Skin: Negative. Allergic/Immunologic: Negative. Neurological: Negative. Hematological: Negative. Psychiatric/Behavioral: Negative. Objective:   Physical Exam  General Exam:    Vitals: Blood pressure (!) 143/84, pulse 81, height 5' 11\" (1.803 m), weight 254 lb (115.2 kg). Constitutional: Patient is adequately groomed with no evidence of malnutrition  Mental Status: The patient is oriented to time, place and person. The patient's mood and affect are appropriate. Gait:  Patient walks with normal gait and station. Lymphatic: The lymphatic examination bilaterally reveals all areas to be without enlargement or induration. Vascular: Examination reveals no swelling or calf tenderness. Peripheral pulses are palpable and 2+. Neurological: The patient has good coordination. There is no weakness or sensory deficit. Skin:    Head/Neck: inspection reveals no rashes, ulcerations or lesions. Trunk:  inspection reveals no rashes, ulcerations or lesions. Right Lower Extremity: inspection reveals no rashes, ulcerations or lesions.   Left Lower Extremity: inspection reveals no rashes, ulcerations or lesions. Right elbow has exquisite tenderness over the lateral epicondyle. He has pain with resisted wrist extension. He has significant pain with gripping. He has mild pain with supination and pronation. It has brisk capillary refill with normal sensation. He has no tenderness at the wrist.  He has no instability. Passive range of motion in flexion and extension at the elbow are normal.    Right elbow x-rays AP and lateral obtained today demonstrate: No bony abnormalities    Most recent hemoglobin A1c was 8.4. Assessment:      Right lateral epicondylitis      Plan: We are going to try one cortisone injection. I counseled him about the importance of working on good blood glucose control. Procedure: Under sterile technique, the right elbow was injected in the lateral epicondyle with 80 mg Depo-Medrol 10 mg of lidocaine. He tolerated that well. Follow-up with me for further trouble. Next appropriate step in treatment would be an MRI. This note was created using voice recognition software. It has been proofread, but occasionally errors remain. Please disregard these errors. They will be corrected as they are noted.

## 2019-01-03 ENCOUNTER — HOSPITAL ENCOUNTER (OUTPATIENT)
Dept: PHYSICAL THERAPY | Age: 44
Setting detail: THERAPIES SERIES
Discharge: HOME | End: 2019-01-03
Payer: COMMERCIAL

## 2019-01-03 PROCEDURE — G8986 CARRY D/C STATUS: HCPCS

## 2019-01-03 NOTE — PLAN OF CARE
Physical Therapy Discharge Summary      Date:  1/3/2019    Patient Name:  Roshan Walters    :  1975  MRN: [id number]  Restrictions/Precautions:   Medical/Treatment Diagnosis Information:  · Diagnosis: M75.02 (ICD-10-CM) - Adhesive capsulitis of left shoulder; S43.432A (ICD-10-CM) - Superior labrum anterior-to-posterior (SLAP) tear of left shoulder; M75.42 (ICD-10-CM) - Shoulder impingement, left  · Treatment Diagnosis: M25.512 (ICD-10-CM) - Acute pain of left shoulder  Insurance/Certification information:  PT Insurance Information: Mami     Dear  ,    We had the pleasure of treating the following patient for physical therapy services at [address] [place] [address]. A summary of our findings can be found in the discharge summary below. If you have any questions or concerns regarding these findings, please do not hesitate to contact me at the office phone number checked above. Thank you for the referral.     Physician Signature:________________________________Date:__________________  By signing above (or electronic signature), therapists plan is approved by physician      Overall Response to Treatment:   []Patient is responding well to treatment and improvement is noted with regards  to goals   []Patient should continue to improve in reasonable time if they continue HEP   []Patient has plateaued and is no longer responding to skilled PT intervention    []Patient is getting worse and would benefit from return to referring MD   [x]Patient unable to adhere to initial POC   []Other:     Date range of Visits: 18-10/11/18    Total Visits: 7        G-Code (if applicable):      Date / Visit # G-Code Applied:  /         Patient has not returned since 10/11/18. Therefore, being D/C at this time. Final Status Unknown.     Graciela Kim

## 2019-01-21 ENCOUNTER — OFFICE VISIT (OUTPATIENT)
Dept: ORTHOPEDIC SURGERY | Age: 44
End: 2019-01-21
Payer: COMMERCIAL

## 2019-01-21 VITALS
DIASTOLIC BLOOD PRESSURE: 94 MMHG | SYSTOLIC BLOOD PRESSURE: 140 MMHG | HEART RATE: 91 BPM | WEIGHT: 254 LBS | HEIGHT: 71 IN | BODY MASS INDEX: 35.56 KG/M2

## 2019-01-21 DIAGNOSIS — M77.11 RIGHT TENNIS ELBOW: ICD-10-CM

## 2019-01-21 DIAGNOSIS — M25.521 RIGHT ELBOW PAIN: Primary | ICD-10-CM

## 2019-01-21 PROCEDURE — 99212 OFFICE O/P EST SF 10 MIN: CPT | Performed by: ORTHOPAEDIC SURGERY

## 2019-01-24 ENCOUNTER — OFFICE VISIT (OUTPATIENT)
Dept: ORTHOPEDIC SURGERY | Age: 44
End: 2019-01-24
Payer: COMMERCIAL

## 2019-01-24 VITALS
HEART RATE: 93 BPM | SYSTOLIC BLOOD PRESSURE: 125 MMHG | DIASTOLIC BLOOD PRESSURE: 74 MMHG | HEIGHT: 71 IN | WEIGHT: 254 LBS | BODY MASS INDEX: 35.56 KG/M2

## 2019-01-24 DIAGNOSIS — M25.521 RIGHT ELBOW PAIN: Primary | ICD-10-CM

## 2019-01-24 DIAGNOSIS — M77.11 RIGHT TENNIS ELBOW: ICD-10-CM

## 2019-01-24 PROCEDURE — 99213 OFFICE O/P EST LOW 20 MIN: CPT | Performed by: ORTHOPAEDIC SURGERY

## 2019-02-01 ENCOUNTER — TELEPHONE (OUTPATIENT)
Dept: ORTHOPEDIC SURGERY | Age: 44
End: 2019-02-01

## 2019-02-01 PROBLEM — Z01.818 PRE-OP EVALUATION: Status: ACTIVE | Noted: 2019-02-01

## 2019-02-12 ENCOUNTER — ANESTHESIA EVENT (OUTPATIENT)
Dept: OPERATING ROOM | Age: 44
End: 2019-02-12
Payer: COMMERCIAL

## 2019-02-12 ENCOUNTER — TELEPHONE (OUTPATIENT)
Dept: ORTHOPEDIC SURGERY | Age: 44
End: 2019-02-12

## 2019-02-12 DIAGNOSIS — M25.521 RIGHT ELBOW PAIN: Primary | ICD-10-CM

## 2019-02-12 DIAGNOSIS — M77.11 RIGHT TENNIS ELBOW: ICD-10-CM

## 2019-02-12 RX ORDER — PROMETHAZINE HYDROCHLORIDE 25 MG/1
25 TABLET ORAL EVERY 6 HOURS PRN
Qty: 30 TABLET | Refills: 0 | Status: SHIPPED | OUTPATIENT
Start: 2019-02-12 | End: 2019-02-19

## 2019-02-12 RX ORDER — OXYCODONE HYDROCHLORIDE AND ACETAMINOPHEN 5; 325 MG/1; MG/1
1 TABLET ORAL EVERY 6 HOURS PRN
Qty: 20 TABLET | Refills: 0 | Status: SHIPPED | OUTPATIENT
Start: 2019-02-13 | End: 2019-02-18

## 2019-02-12 RX ORDER — DOCUSATE SODIUM 100 MG/1
100 CAPSULE, LIQUID FILLED ORAL 2 TIMES DAILY
Qty: 60 CAPSULE | Refills: 0 | Status: SHIPPED | OUTPATIENT
Start: 2019-02-12 | End: 2019-06-13

## 2019-02-13 ENCOUNTER — HOSPITAL ENCOUNTER (OUTPATIENT)
Age: 44
Setting detail: OUTPATIENT SURGERY
Discharge: HOME | End: 2019-02-13
Attending: ORTHOPAEDIC SURGERY | Admitting: ORTHOPAEDIC SURGERY
Payer: COMMERCIAL

## 2019-02-13 ENCOUNTER — ANESTHESIA (OUTPATIENT)
Dept: OPERATING ROOM | Age: 44
End: 2019-02-13
Payer: COMMERCIAL

## 2019-02-13 VITALS
WEIGHT: 268.63 LBS | DIASTOLIC BLOOD PRESSURE: 83 MMHG | BODY MASS INDEX: 38.46 KG/M2 | TEMPERATURE: 98.6 F | HEART RATE: 74 BPM | OXYGEN SATURATION: 95 % | HEIGHT: 70 IN | RESPIRATION RATE: 14 BRPM | SYSTOLIC BLOOD PRESSURE: 131 MMHG

## 2019-02-13 VITALS
RESPIRATION RATE: 1 BRPM | SYSTOLIC BLOOD PRESSURE: 112 MMHG | OXYGEN SATURATION: 100 % | TEMPERATURE: 97.2 F | DIASTOLIC BLOOD PRESSURE: 69 MMHG

## 2019-02-13 LAB
ANION GAP SERPL CALCULATED.3IONS-SCNC: 12 MMOL/L (ref 3–16)
BUN BLDV-MCNC: 17 MG/DL (ref 7–20)
CALCIUM SERPL-MCNC: 9.4 MG/DL (ref 8.3–10.6)
CHLORIDE BLD-SCNC: 105 MMOL/L (ref 99–110)
CO2: 26 MMOL/L (ref 21–32)
CREAT SERPL-MCNC: 0.9 MG/DL (ref 0.9–1.3)
GFR AFRICAN AMERICAN: >60
GFR NON-AFRICAN AMERICAN: >60
GLUCOSE BLD-MCNC: 198 MG/DL (ref 70–99)
GLUCOSE BLD-MCNC: 231 MG/DL (ref 70–99)
GLUCOSE BLD-MCNC: 254 MG/DL (ref 70–99)
HCT VFR BLD CALC: 42.4 % (ref 40.5–52.5)
HEMOGLOBIN: 14.2 G/DL (ref 13.5–17.5)
MCH RBC QN AUTO: 30.7 PG (ref 26–34)
MCHC RBC AUTO-ENTMCNC: 33.6 G/DL (ref 31–36)
MCV RBC AUTO: 91.4 FL (ref 80–100)
PDW BLD-RTO: 12.4 % (ref 12.4–15.4)
PERFORMED ON: ABNORMAL
PERFORMED ON: ABNORMAL
PLATELET # BLD: 145 K/UL (ref 135–450)
PMV BLD AUTO: 9.3 FL (ref 5–10.5)
POTASSIUM REFLEX MAGNESIUM: 4.5 MMOL/L (ref 3.5–5.1)
RBC # BLD: 4.64 M/UL (ref 4.2–5.9)
SODIUM BLD-SCNC: 143 MMOL/L (ref 136–145)
WBC # BLD: 5.9 K/UL (ref 4–11)

## 2019-02-13 PROCEDURE — 2580000003 HC RX 258: Performed by: ANESTHESIOLOGY

## 2019-02-13 PROCEDURE — 2580000003 HC RX 258: Performed by: NURSE ANESTHETIST, CERTIFIED REGISTERED

## 2019-02-13 PROCEDURE — 3700000001 HC ADD 15 MINUTES (ANESTHESIA): Performed by: ORTHOPAEDIC SURGERY

## 2019-02-13 PROCEDURE — 80048 BASIC METABOLIC PNL TOTAL CA: CPT

## 2019-02-13 PROCEDURE — 3600000014 HC SURGERY LEVEL 4 ADDTL 15MIN: Performed by: ORTHOPAEDIC SURGERY

## 2019-02-13 PROCEDURE — 7100000000 HC PACU RECOVERY - FIRST 15 MIN: Performed by: ORTHOPAEDIC SURGERY

## 2019-02-13 PROCEDURE — 85027 COMPLETE CBC AUTOMATED: CPT

## 2019-02-13 PROCEDURE — 7100000001 HC PACU RECOVERY - ADDTL 15 MIN: Performed by: ORTHOPAEDIC SURGERY

## 2019-02-13 PROCEDURE — 6360000002 HC RX W HCPCS

## 2019-02-13 PROCEDURE — 2580000003 HC RX 258: Performed by: ORTHOPAEDIC SURGERY

## 2019-02-13 PROCEDURE — 2500000003 HC RX 250 WO HCPCS: Performed by: ANESTHESIOLOGY

## 2019-02-13 PROCEDURE — 3600000004 HC SURGERY LEVEL 4 BASE: Performed by: ORTHOPAEDIC SURGERY

## 2019-02-13 PROCEDURE — 6360000002 HC RX W HCPCS: Performed by: NURSE ANESTHETIST, CERTIFIED REGISTERED

## 2019-02-13 PROCEDURE — 6370000000 HC RX 637 (ALT 250 FOR IP): Performed by: ANESTHESIOLOGY

## 2019-02-13 PROCEDURE — 2709999900 HC NON-CHARGEABLE SUPPLY: Performed by: ORTHOPAEDIC SURGERY

## 2019-02-13 PROCEDURE — 6360000002 HC RX W HCPCS: Performed by: ORTHOPAEDIC SURGERY

## 2019-02-13 PROCEDURE — 7100000010 HC PHASE II RECOVERY - FIRST 15 MIN: Performed by: ORTHOPAEDIC SURGERY

## 2019-02-13 PROCEDURE — 2500000003 HC RX 250 WO HCPCS: Performed by: NURSE ANESTHETIST, CERTIFIED REGISTERED

## 2019-02-13 PROCEDURE — 2500000003 HC RX 250 WO HCPCS: Performed by: ORTHOPAEDIC SURGERY

## 2019-02-13 PROCEDURE — 3700000000 HC ANESTHESIA ATTENDED CARE: Performed by: ORTHOPAEDIC SURGERY

## 2019-02-13 PROCEDURE — 7100000011 HC PHASE II RECOVERY - ADDTL 15 MIN: Performed by: ORTHOPAEDIC SURGERY

## 2019-02-13 RX ORDER — MEPERIDINE HYDROCHLORIDE 25 MG/ML
12.5 INJECTION INTRAMUSCULAR; INTRAVENOUS; SUBCUTANEOUS EVERY 5 MIN PRN
Status: DISCONTINUED | OUTPATIENT
Start: 2019-02-13 | End: 2019-02-13 | Stop reason: HOSPADM

## 2019-02-13 RX ORDER — BUPIVACAINE HYDROCHLORIDE AND EPINEPHRINE 2.5; 5 MG/ML; UG/ML
INJECTION, SOLUTION EPIDURAL; INFILTRATION; INTRACAUDAL; PERINEURAL
Status: COMPLETED | OUTPATIENT
Start: 2019-02-13 | End: 2019-02-13

## 2019-02-13 RX ORDER — GLYCOPYRROLATE 0.2 MG/ML
INJECTION INTRAMUSCULAR; INTRAVENOUS PRN
Status: DISCONTINUED | OUTPATIENT
Start: 2019-02-13 | End: 2019-02-13 | Stop reason: SDUPTHER

## 2019-02-13 RX ORDER — MORPHINE SULFATE 2 MG/ML
1 INJECTION, SOLUTION INTRAMUSCULAR; INTRAVENOUS EVERY 5 MIN PRN
Status: DISCONTINUED | OUTPATIENT
Start: 2019-02-13 | End: 2019-02-13 | Stop reason: HOSPADM

## 2019-02-13 RX ORDER — ROCURONIUM BROMIDE 10 MG/ML
INJECTION, SOLUTION INTRAVENOUS PRN
Status: DISCONTINUED | OUTPATIENT
Start: 2019-02-13 | End: 2019-02-13 | Stop reason: SDUPTHER

## 2019-02-13 RX ORDER — SODIUM CHLORIDE 0.9 % (FLUSH) 0.9 %
10 SYRINGE (ML) INJECTION PRN
Status: DISCONTINUED | OUTPATIENT
Start: 2019-02-13 | End: 2019-02-13 | Stop reason: HOSPADM

## 2019-02-13 RX ORDER — MORPHINE SULFATE 2 MG/ML
2 INJECTION, SOLUTION INTRAMUSCULAR; INTRAVENOUS EVERY 5 MIN PRN
Status: DISCONTINUED | OUTPATIENT
Start: 2019-02-13 | End: 2019-02-13 | Stop reason: HOSPADM

## 2019-02-13 RX ORDER — NEOSTIGMINE METHYLSULFATE 1 MG/ML
INJECTION, SOLUTION INTRAVENOUS PRN
Status: DISCONTINUED | OUTPATIENT
Start: 2019-02-13 | End: 2019-02-13 | Stop reason: SDUPTHER

## 2019-02-13 RX ORDER — SODIUM CHLORIDE, SODIUM LACTATE, POTASSIUM CHLORIDE, CALCIUM CHLORIDE 600; 310; 30; 20 MG/100ML; MG/100ML; MG/100ML; MG/100ML
INJECTION, SOLUTION INTRAVENOUS CONTINUOUS PRN
Status: DISCONTINUED | OUTPATIENT
Start: 2019-02-13 | End: 2019-02-13 | Stop reason: SDUPTHER

## 2019-02-13 RX ORDER — SODIUM CHLORIDE 9 MG/ML
INJECTION, SOLUTION INTRAVENOUS CONTINUOUS
Status: DISCONTINUED | OUTPATIENT
Start: 2019-02-13 | End: 2019-02-13 | Stop reason: HOSPADM

## 2019-02-13 RX ORDER — SODIUM CHLORIDE 0.9 % (FLUSH) 0.9 %
10 SYRINGE (ML) INJECTION EVERY 12 HOURS SCHEDULED
Status: DISCONTINUED | OUTPATIENT
Start: 2019-02-13 | End: 2019-02-13 | Stop reason: HOSPADM

## 2019-02-13 RX ORDER — MAGNESIUM HYDROXIDE 1200 MG/15ML
LIQUID ORAL CONTINUOUS PRN
Status: COMPLETED | OUTPATIENT
Start: 2019-02-13 | End: 2019-02-13

## 2019-02-13 RX ORDER — OXYCODONE HYDROCHLORIDE 5 MG/1
5 TABLET ORAL PRN
Status: COMPLETED | OUTPATIENT
Start: 2019-02-13 | End: 2019-02-13

## 2019-02-13 RX ORDER — LIDOCAINE HYDROCHLORIDE 20 MG/ML
INJECTION, SOLUTION INFILTRATION; PERINEURAL PRN
Status: DISCONTINUED | OUTPATIENT
Start: 2019-02-13 | End: 2019-02-13 | Stop reason: SDUPTHER

## 2019-02-13 RX ORDER — ONDANSETRON 2 MG/ML
INJECTION INTRAMUSCULAR; INTRAVENOUS PRN
Status: DISCONTINUED | OUTPATIENT
Start: 2019-02-13 | End: 2019-02-13 | Stop reason: SDUPTHER

## 2019-02-13 RX ORDER — ONDANSETRON 2 MG/ML
4 INJECTION INTRAMUSCULAR; INTRAVENOUS
Status: DISCONTINUED | OUTPATIENT
Start: 2019-02-13 | End: 2019-02-13 | Stop reason: HOSPADM

## 2019-02-13 RX ORDER — OXYCODONE HYDROCHLORIDE 5 MG/1
10 TABLET ORAL PRN
Status: COMPLETED | OUTPATIENT
Start: 2019-02-13 | End: 2019-02-13

## 2019-02-13 RX ORDER — FENTANYL CITRATE 50 UG/ML
25 INJECTION, SOLUTION INTRAMUSCULAR; INTRAVENOUS EVERY 5 MIN PRN
Status: DISCONTINUED | OUTPATIENT
Start: 2019-02-13 | End: 2019-02-13 | Stop reason: HOSPADM

## 2019-02-13 RX ORDER — PROPOFOL 10 MG/ML
INJECTION, EMULSION INTRAVENOUS PRN
Status: DISCONTINUED | OUTPATIENT
Start: 2019-02-13 | End: 2019-02-13 | Stop reason: SDUPTHER

## 2019-02-13 RX ORDER — FENTANYL CITRATE 50 UG/ML
50 INJECTION, SOLUTION INTRAMUSCULAR; INTRAVENOUS EVERY 5 MIN PRN
Status: DISCONTINUED | OUTPATIENT
Start: 2019-02-13 | End: 2019-02-13 | Stop reason: HOSPADM

## 2019-02-13 RX ADMIN — GLYCOPYRROLATE 0.4 MG: 0.2 INJECTION, SOLUTION INTRAMUSCULAR; INTRAVENOUS at 12:44

## 2019-02-13 RX ADMIN — FAMOTIDINE 20 MG: 10 INJECTION, SOLUTION INTRAVENOUS at 10:35

## 2019-02-13 RX ADMIN — OXYCODONE HYDROCHLORIDE 10 MG: 5 TABLET ORAL at 13:53

## 2019-02-13 RX ADMIN — ROCURONIUM BROMIDE 40 MG: 10 INJECTION INTRAVENOUS at 12:03

## 2019-02-13 RX ADMIN — GLYCOPYRROLATE 0.2 MG: 0.2 INJECTION, SOLUTION INTRAMUSCULAR; INTRAVENOUS at 11:55

## 2019-02-13 RX ADMIN — LIDOCAINE HYDROCHLORIDE 50 MG: 20 INJECTION, SOLUTION INFILTRATION; PERINEURAL at 12:03

## 2019-02-13 RX ADMIN — SODIUM CHLORIDE: 9 INJECTION, SOLUTION INTRAVENOUS at 10:35

## 2019-02-13 RX ADMIN — ONDANSETRON 4 MG: 2 INJECTION INTRAMUSCULAR; INTRAVENOUS at 12:33

## 2019-02-13 RX ADMIN — Medication 3 G: at 12:09

## 2019-02-13 RX ADMIN — NEOSTIGMINE 3 MG: 1 INJECTION INTRAVENOUS at 12:44

## 2019-02-13 RX ADMIN — PROPOFOL 200 MG: 10 INJECTION, EMULSION INTRAVENOUS at 12:03

## 2019-02-13 RX ADMIN — SODIUM CHLORIDE, SODIUM LACTATE, POTASSIUM CHLORIDE, AND CALCIUM CHLORIDE: .6; .31; .03; .02 INJECTION, SOLUTION INTRAVENOUS at 11:55

## 2019-02-13 ASSESSMENT — PULMONARY FUNCTION TESTS
PIF_VALUE: 20
PIF_VALUE: 1
PIF_VALUE: 19
PIF_VALUE: 19
PIF_VALUE: 20
PIF_VALUE: 20
PIF_VALUE: 0
PIF_VALUE: 14
PIF_VALUE: 19
PIF_VALUE: 18
PIF_VALUE: 20
PIF_VALUE: 16
PIF_VALUE: 12
PIF_VALUE: 0
PIF_VALUE: 20
PIF_VALUE: 1
PIF_VALUE: 16
PIF_VALUE: 19
PIF_VALUE: 9
PIF_VALUE: 20
PIF_VALUE: 19
PIF_VALUE: 20
PIF_VALUE: 1
PIF_VALUE: 19
PIF_VALUE: 20
PIF_VALUE: 14
PIF_VALUE: 20
PIF_VALUE: 5
PIF_VALUE: 20
PIF_VALUE: 20
PIF_VALUE: 1
PIF_VALUE: 19
PIF_VALUE: 22
PIF_VALUE: 20
PIF_VALUE: 16
PIF_VALUE: 20
PIF_VALUE: 0
PIF_VALUE: 19
PIF_VALUE: 20
PIF_VALUE: 13
PIF_VALUE: 20
PIF_VALUE: 7
PIF_VALUE: 13
PIF_VALUE: 20
PIF_VALUE: 20
PIF_VALUE: 4
PIF_VALUE: 12
PIF_VALUE: 19
PIF_VALUE: 20
PIF_VALUE: 19
PIF_VALUE: 20
PIF_VALUE: 20

## 2019-02-13 ASSESSMENT — ENCOUNTER SYMPTOMS: SHORTNESS OF BREATH: 0

## 2019-02-13 ASSESSMENT — PAIN DESCRIPTION - LOCATION
LOCATION: WRIST;OTHER (COMMENT)
LOCATION: WRIST

## 2019-02-13 ASSESSMENT — PAIN SCALES - GENERAL
PAINLEVEL_OUTOF10: 1
PAINLEVEL_OUTOF10: 7
PAINLEVEL_OUTOF10: 5

## 2019-02-13 ASSESSMENT — LIFESTYLE VARIABLES: SMOKING_STATUS: 0

## 2019-02-13 ASSESSMENT — PAIN - FUNCTIONAL ASSESSMENT: PAIN_FUNCTIONAL_ASSESSMENT: 0-10

## 2019-02-13 ASSESSMENT — PAIN DESCRIPTION - DESCRIPTORS: DESCRIPTORS: ACHING

## 2019-02-15 ENCOUNTER — OFFICE VISIT (OUTPATIENT)
Dept: ORTHOPEDIC SURGERY | Age: 44
End: 2019-02-15
Payer: COMMERCIAL

## 2019-02-15 VITALS — HEIGHT: 71 IN | BODY MASS INDEX: 35.56 KG/M2 | WEIGHT: 254 LBS

## 2019-02-15 DIAGNOSIS — M25.521 RIGHT ELBOW PAIN: Primary | ICD-10-CM

## 2019-02-15 DIAGNOSIS — M77.11 RIGHT TENNIS ELBOW: ICD-10-CM

## 2019-02-15 PROCEDURE — L3908 WHO COCK-UP NONMOLDE PRE OTS: HCPCS | Performed by: ORTHOPAEDIC SURGERY

## 2019-02-15 PROCEDURE — 99024 POSTOP FOLLOW-UP VISIT: CPT | Performed by: ORTHOPAEDIC SURGERY

## 2019-02-22 ENCOUNTER — OFFICE VISIT (OUTPATIENT)
Dept: ORTHOPEDIC SURGERY | Age: 44
End: 2019-02-22

## 2019-02-22 VITALS — RESPIRATION RATE: 12 BRPM | HEIGHT: 71 IN | WEIGHT: 254 LBS | BODY MASS INDEX: 35.56 KG/M2

## 2019-02-22 DIAGNOSIS — M77.11 RIGHT TENNIS ELBOW: Primary | ICD-10-CM

## 2019-02-22 PROCEDURE — 99024 POSTOP FOLLOW-UP VISIT: CPT | Performed by: ORTHOPAEDIC SURGERY

## 2019-03-03 PROBLEM — Z01.818 PRE-OP EVALUATION: Status: RESOLVED | Noted: 2019-02-01 | Resolved: 2019-03-03

## 2019-03-15 ENCOUNTER — OFFICE VISIT (OUTPATIENT)
Dept: ORTHOPEDIC SURGERY | Age: 44
End: 2019-03-15

## 2019-03-15 VITALS — HEIGHT: 71 IN | BODY MASS INDEX: 35.56 KG/M2 | WEIGHT: 254 LBS

## 2019-03-15 DIAGNOSIS — M77.11 RIGHT TENNIS ELBOW: Primary | ICD-10-CM

## 2019-03-15 PROCEDURE — 99024 POSTOP FOLLOW-UP VISIT: CPT | Performed by: ORTHOPAEDIC SURGERY

## 2019-06-13 ENCOUNTER — OFFICE VISIT (OUTPATIENT)
Dept: ENDOCRINOLOGY | Age: 44
End: 2019-06-13
Payer: COMMERCIAL

## 2019-06-13 VITALS
WEIGHT: 275.4 LBS | HEART RATE: 92 BPM | BODY MASS INDEX: 39.43 KG/M2 | SYSTOLIC BLOOD PRESSURE: 120 MMHG | DIASTOLIC BLOOD PRESSURE: 78 MMHG | OXYGEN SATURATION: 96 % | RESPIRATION RATE: 16 BRPM | HEIGHT: 70 IN

## 2019-06-13 DIAGNOSIS — I10 ESSENTIAL HYPERTENSION: ICD-10-CM

## 2019-06-13 DIAGNOSIS — Z79.4 TYPE 2 DIABETES MELLITUS WITH HYPERGLYCEMIA, WITH LONG-TERM CURRENT USE OF INSULIN (HCC): Primary | ICD-10-CM

## 2019-06-13 DIAGNOSIS — E11.65 TYPE 2 DIABETES MELLITUS WITH HYPERGLYCEMIA, WITH LONG-TERM CURRENT USE OF INSULIN (HCC): Primary | ICD-10-CM

## 2019-06-13 LAB — HBA1C MFR BLD: 9.7 %

## 2019-06-13 PROCEDURE — 99214 OFFICE O/P EST MOD 30 MIN: CPT | Performed by: INTERNAL MEDICINE

## 2019-06-13 PROCEDURE — 83036 HEMOGLOBIN GLYCOSYLATED A1C: CPT | Performed by: INTERNAL MEDICINE

## 2019-06-13 NOTE — PROGRESS NOTES
Seen as f/u patient for diabetes    Interim:  Glucose stable  Wife sick since 2016 has IIH  Off keto diet    had bariatric surgery-sleeve gastrectomy , lost 90lb, off U-500 but now stable    Diagnosed with Type 2 diabetes mellitus in  1996  Known diabetic complications: none     Current diabetic medications   Lantus 50 units  Takes 65 if glucose > 200  Metformin 1gm daily  Humalog SSI    Previous  U-500 40/35 units BID SSI 1 for 50 >150  -10 <80    Taking  25-35 once a day    Farxiga 5mg stopped due to yeast infection    Initial  Lantus 110 unit BID  Metformin 1000mg BID    Last A1c 9.7%<-----8.4%<------6%<------9.4 on 10/17<------7.8 on 2/16<-----7.6 on 6/15<-----9.9 on 2/15<-----  8.0 on 11/14<----- 8.3 on 7/14<----10.6 on 3/14<---11.9 on 1/14<--- 9.4<---10.8    Prior visit with dietician: yes  Current diet: 0 Carbs low Isaiah  Current exercise: walking   Current monitoring regimen: home blood tests -1 times daily     Has brought blood glucose log/meter:no  Home blood sugar records: 120-398  Any episodes of hypoglycemia? Occ    No Hx of CAD , PVD, CVA  H/o pancreatitis 2006    Hyperlipidemia:  For years, tolerating medication  1/14 T.C 544 TG 3800 HDL 76   8/13     Fenofibrate 160 Lipitor 20mg    on 3/14 - he stopped fenofibrate and lipitor, now restarted    on 10/17  lipitor and fenofibrate      On 3/18   Did not have labs    Last eye exam:5/19  Last foot exam:6/19  Last microalbumin to creatinine ratio: 58 on 1/14---> 11/17    HTN: stable but now high  Lisinopril 20mg  ASA No  Smoking No    FH of diabetes, mom was diabetic, she was +600 units with no good control. TG was 85897. Sister does not have diabetes  Lost weight from 400 to 310 over 8 years    Review of Systems  Scanned reviewed total lost 150lb  20 lb gain    Objective:      /78 (Site: Left Upper Arm, Position: Sitting, Cuff Size: Large Adult)   Pulse 92   Resp 16   Ht 5' 10\" (1.778 m)   Wt 275 lb 6.4 oz (124.9 kg)   SpO2 96%   BMI 39.52 kg/m²   Wt Readings from Last 3 Encounters:   06/13/19 275 lb 6.4 oz (124.9 kg)   03/15/19 254 lb (115.2 kg)   02/22/19 254 lb (115.2 kg)     Constitutional: Well-developed, appears stated age, cooperative, in no acute distress  H/E/N/M/T:atraumatic, normocephalic, external ears, nose, lips normal without lesions  Eyes: Lids, lashes, conjunctivae and sclerae normal, No proptosis, no redness  Neck: supple, symmetrical, no swelling  Skin: No obvious rashes or lesions present. Skin and hair texture normal  Psychiatric: Judgement and Insight:  judgement and insight appear normal  Neuro: Normal without focal findings, speech is normal normal, speech is spontaneous  Chest: No labored breathing, no chest deformity, no stridor  Musculoskeletal: No joint deformity, swelling    6/19  Skeletal foot exam is normal, no skin lesions, 10 g monofilament is 10/10 on the right and 10/10 on the left  Reduced on big toe    Lab Reviewed   No components found for: CHLPL  Lab Results   Component Value Date    TRIG 135 03/15/2018    TRIG 1,060 (H) 12/01/2017    TRIG 153 (H) 10/11/2017     Lab Results   Component Value Date    HDL 40 03/15/2018    HDL 20 (L) 12/01/2017    HDL 45 10/11/2017     Lab Results   Component Value Date    LDLCALC 130 (H) 03/15/2018    LDLCALC see below 12/01/2017    LDLCALC 110 (H) 10/11/2017     Lab Results   Component Value Date    LABVLDL 27 03/15/2018    LABVLDL see below 12/01/2017    LABVLDL 31 10/11/2017     Lab Results   Component Value Date    LABA1C 8.4 11/15/2018       Assessment:     Jose Romo is a 44 y.o. male with :    1.T2DM:Insulin resistant secondary to obesity, + FH. Avoid GLP agonist given pancreatitis, A1c high, off Keto diet. A1c high. Will switch back to U-500  2. HTN:At goal  3. HLD:He has familial hyperlipidemia. Discussed dietary restriction, continue fibrate and statin, added fish oil, TG improved, LDL high, increased lipitor, needs repeat  4. Obesity: s/p sleeve,gaining weight, advise loss    Plan:      Stop lantus and humalog   Start U-500  50 units before breakfast and supper     SSI 5 for 50 > 150   Avoid GLP given pancreatitis history   Advise to check blood sugar 2 times a day   Patient to send blood sugar log for titration. Advise to exercise regularly. Advise to low simple carbohydrate and protein with each  meal diet. Diabetes Care: recommend yearly eye exam, foot exam and urine microalbumin to   creatinine ratio.  Patient is due    -Hyperlipidemia, LDL goal is <100 mg/dl    Once off insulin, will see PCP only

## 2019-08-12 ENCOUNTER — OFFICE VISIT (OUTPATIENT)
Dept: ORTHOPEDIC SURGERY | Age: 44
End: 2019-08-12
Payer: COMMERCIAL

## 2019-08-12 VITALS
WEIGHT: 276 LBS | DIASTOLIC BLOOD PRESSURE: 93 MMHG | HEART RATE: 84 BPM | RESPIRATION RATE: 12 BRPM | SYSTOLIC BLOOD PRESSURE: 148 MMHG | BODY MASS INDEX: 38.64 KG/M2 | HEIGHT: 71 IN

## 2019-08-12 DIAGNOSIS — G89.29 CHRONIC RIGHT SHOULDER PAIN: Primary | ICD-10-CM

## 2019-08-12 DIAGNOSIS — M75.41 SHOULDER IMPINGEMENT, RIGHT: ICD-10-CM

## 2019-08-12 DIAGNOSIS — M25.511 CHRONIC RIGHT SHOULDER PAIN: Primary | ICD-10-CM

## 2019-08-12 PROCEDURE — 20610 DRAIN/INJ JOINT/BURSA W/O US: CPT | Performed by: ORTHOPAEDIC SURGERY

## 2019-08-12 PROCEDURE — 99214 OFFICE O/P EST MOD 30 MIN: CPT | Performed by: ORTHOPAEDIC SURGERY

## 2019-08-12 RX ORDER — METHYLPREDNISOLONE ACETATE 40 MG/ML
80 INJECTION, SUSPENSION INTRA-ARTICULAR; INTRALESIONAL; INTRAMUSCULAR; SOFT TISSUE ONCE
Status: COMPLETED | OUTPATIENT
Start: 2019-08-12 | End: 2019-08-12

## 2019-08-12 RX ADMIN — METHYLPREDNISOLONE ACETATE 80 MG: 40 INJECTION, SUSPENSION INTRA-ARTICULAR; INTRALESIONAL; INTRAMUSCULAR; SOFT TISSUE at 09:26

## 2019-08-12 ASSESSMENT — ENCOUNTER SYMPTOMS
RESPIRATORY NEGATIVE: 1
EYES NEGATIVE: 1
BACK PAIN: 1
GASTROINTESTINAL NEGATIVE: 1

## 2019-09-05 RX ORDER — ATORVASTATIN CALCIUM 40 MG/1
TABLET, FILM COATED ORAL
Qty: 90 TABLET | Refills: 2 | Status: SHIPPED | OUTPATIENT
Start: 2019-09-05 | End: 2020-05-18

## 2019-10-18 RX ORDER — LISINOPRIL 20 MG/1
TABLET ORAL
Qty: 90 TABLET | Refills: 3 | Status: SHIPPED | OUTPATIENT
Start: 2019-10-18 | End: 2020-12-21

## 2019-10-18 RX ORDER — OMEGA-3-ACID ETHYL ESTERS 1 G/1
CAPSULE, LIQUID FILLED ORAL
Qty: 90 CAPSULE | Refills: 3 | Status: SHIPPED | OUTPATIENT
Start: 2019-10-18 | End: 2020-06-24

## 2019-12-02 ENCOUNTER — OFFICE VISIT (OUTPATIENT)
Dept: ORTHOPEDIC SURGERY | Age: 44
End: 2019-12-02
Payer: COMMERCIAL

## 2019-12-02 VITALS — HEIGHT: 71 IN | BODY MASS INDEX: 38.64 KG/M2 | WEIGHT: 276 LBS

## 2019-12-02 DIAGNOSIS — M25.511 CHRONIC RIGHT SHOULDER PAIN: ICD-10-CM

## 2019-12-02 DIAGNOSIS — G89.29 CHRONIC RIGHT SHOULDER PAIN: ICD-10-CM

## 2019-12-02 DIAGNOSIS — M75.41 SHOULDER IMPINGEMENT, RIGHT: Primary | ICD-10-CM

## 2019-12-02 PROCEDURE — 99212 OFFICE O/P EST SF 10 MIN: CPT | Performed by: ORTHOPAEDIC SURGERY

## 2019-12-05 ENCOUNTER — OFFICE VISIT (OUTPATIENT)
Dept: ORTHOPEDIC SURGERY | Age: 44
End: 2019-12-05
Payer: COMMERCIAL

## 2019-12-05 VITALS
HEART RATE: 84 BPM | DIASTOLIC BLOOD PRESSURE: 84 MMHG | BODY MASS INDEX: 38.64 KG/M2 | WEIGHT: 276 LBS | HEIGHT: 71 IN | SYSTOLIC BLOOD PRESSURE: 139 MMHG

## 2019-12-05 DIAGNOSIS — M75.41 SHOULDER IMPINGEMENT, RIGHT: Primary | ICD-10-CM

## 2019-12-05 DIAGNOSIS — G89.29 CHRONIC RIGHT SHOULDER PAIN: ICD-10-CM

## 2019-12-05 DIAGNOSIS — M75.121 NONTRAUMATIC COMPLETE TEAR OF RIGHT ROTATOR CUFF: ICD-10-CM

## 2019-12-05 DIAGNOSIS — M25.511 CHRONIC RIGHT SHOULDER PAIN: ICD-10-CM

## 2019-12-05 PROCEDURE — 99212 OFFICE O/P EST SF 10 MIN: CPT | Performed by: ORTHOPAEDIC SURGERY

## 2019-12-05 PROCEDURE — L3670 SO ACRO/CLAV CAN WEB PRE OTS: HCPCS | Performed by: ORTHOPAEDIC SURGERY

## 2019-12-11 ENCOUNTER — TELEPHONE (OUTPATIENT)
Dept: ORTHOPEDIC SURGERY | Age: 44
End: 2019-12-11

## 2019-12-24 ENCOUNTER — TELEPHONE (OUTPATIENT)
Dept: ORTHOPEDIC SURGERY | Age: 44
End: 2019-12-24

## 2019-12-24 DIAGNOSIS — M75.41 SHOULDER IMPINGEMENT, RIGHT: Primary | ICD-10-CM

## 2019-12-24 DIAGNOSIS — M25.511 CHRONIC RIGHT SHOULDER PAIN: ICD-10-CM

## 2019-12-24 DIAGNOSIS — M75.121 NONTRAUMATIC COMPLETE TEAR OF RIGHT ROTATOR CUFF: ICD-10-CM

## 2019-12-24 DIAGNOSIS — G89.29 CHRONIC RIGHT SHOULDER PAIN: ICD-10-CM

## 2019-12-24 RX ORDER — DOCUSATE SODIUM 100 MG/1
100 CAPSULE, LIQUID FILLED ORAL 2 TIMES DAILY
Qty: 60 CAPSULE | Refills: 0 | Status: SHIPPED | OUTPATIENT
Start: 2019-12-27 | End: 2020-08-17 | Stop reason: CLARIF

## 2019-12-24 RX ORDER — OXYCODONE HYDROCHLORIDE AND ACETAMINOPHEN 5; 325 MG/1; MG/1
1 TABLET ORAL EVERY 6 HOURS PRN
Qty: 20 TABLET | Refills: 0 | Status: SHIPPED | OUTPATIENT
Start: 2019-12-27 | End: 2019-12-26

## 2019-12-24 RX ORDER — PROMETHAZINE HYDROCHLORIDE 25 MG/1
25 TABLET ORAL EVERY 6 HOURS PRN
Qty: 30 TABLET | Refills: 0 | Status: SHIPPED | OUTPATIENT
Start: 2019-12-27 | End: 2019-12-26

## 2019-12-27 ENCOUNTER — TELEPHONE (OUTPATIENT)
Dept: ORTHOPEDIC SURGERY | Age: 44
End: 2019-12-27

## 2019-12-27 DIAGNOSIS — M75.121 NONTRAUMATIC COMPLETE TEAR OF RIGHT ROTATOR CUFF: Primary | ICD-10-CM

## 2019-12-27 DIAGNOSIS — M25.811 SHOULDER IMPINGEMENT, RIGHT: ICD-10-CM

## 2019-12-27 DIAGNOSIS — M75.41 SHOULDER IMPINGEMENT, RIGHT: ICD-10-CM

## 2019-12-27 DIAGNOSIS — M75.121 NONTRAUMATIC COMPLETE TEAR OF RIGHT ROTATOR CUFF: ICD-10-CM

## 2019-12-27 LAB
ANION GAP SERPL CALCULATED.3IONS-SCNC: 15 MMOL/L (ref 3–16)
BUN BLDV-MCNC: 19 MG/DL (ref 7–20)
CALCIUM SERPL-MCNC: 9.6 MG/DL (ref 8.3–10.6)
CHLORIDE BLD-SCNC: 102 MMOL/L (ref 99–110)
CO2: 25 MMOL/L (ref 21–32)
CREAT SERPL-MCNC: 1 MG/DL (ref 0.9–1.3)
ESTIMATED AVERAGE GLUCOSE: 274.7 MG/DL
GFR AFRICAN AMERICAN: >60
GFR NON-AFRICAN AMERICAN: >60
GLUCOSE BLD-MCNC: 169 MG/DL (ref 70–99)
HBA1C MFR BLD: 11.2 %
POTASSIUM SERPL-SCNC: 4.5 MMOL/L (ref 3.5–5.1)
SODIUM BLD-SCNC: 142 MMOL/L (ref 136–145)

## 2020-01-05 PROBLEM — Z01.818 PRE-OP EVALUATION: Status: RESOLVED | Noted: 2019-02-01 | Resolved: 2020-01-05

## 2020-02-27 ENCOUNTER — TELEPHONE (OUTPATIENT)
Dept: ORTHOPEDIC SURGERY | Age: 45
End: 2020-02-27

## 2020-02-27 ENCOUNTER — OFFICE VISIT (OUTPATIENT)
Dept: ENDOCRINOLOGY | Age: 45
End: 2020-02-27
Payer: COMMERCIAL

## 2020-02-27 ENCOUNTER — PATIENT MESSAGE (OUTPATIENT)
Dept: ORTHOPEDIC SURGERY | Age: 45
End: 2020-02-27

## 2020-02-27 VITALS
SYSTOLIC BLOOD PRESSURE: 136 MMHG | RESPIRATION RATE: 18 BRPM | BODY MASS INDEX: 36.62 KG/M2 | WEIGHT: 261.6 LBS | HEART RATE: 75 BPM | HEIGHT: 71 IN | OXYGEN SATURATION: 96 % | DIASTOLIC BLOOD PRESSURE: 86 MMHG

## 2020-02-27 LAB — HBA1C MFR BLD: 8.5 %

## 2020-02-27 PROCEDURE — 83036 HEMOGLOBIN GLYCOSYLATED A1C: CPT | Performed by: INTERNAL MEDICINE

## 2020-02-27 PROCEDURE — 99214 OFFICE O/P EST MOD 30 MIN: CPT | Performed by: INTERNAL MEDICINE

## 2020-02-27 PROCEDURE — 3052F HG A1C>EQUAL 8.0%<EQUAL 9.0%: CPT | Performed by: INTERNAL MEDICINE

## 2020-02-27 RX ORDER — ICOSAPENT ETHYL 1000 MG/1
2 CAPSULE ORAL 2 TIMES DAILY
Qty: 120 CAPSULE | Refills: 3 | Status: SHIPPED | OUTPATIENT
Start: 2020-02-27 | End: 2020-06-11 | Stop reason: SDUPTHER

## 2020-02-27 NOTE — TELEPHONE ENCOUNTER
From: Jose Ramon Bowling  To: Hu Bartlett MD  Sent: 2/27/2020 3:43 PM EST  Subject: Non-Urgent Medical Question    I just saw doctor robbie, my A1C is 8.5. Can I have my surgery now?  Lol Morley Bence

## 2020-02-27 NOTE — TELEPHONE ENCOUNTER
Patient called to report his A1c is 8.5. He is asking for surgery. Per Dr. Duke Lema, A1c needs to be less that 8 and ideally at a 7. Patient notified. He will follow up with Dr. Vipin Patterson.

## 2020-02-27 NOTE — PROGRESS NOTES
Seen as f/u patient for diabetes    Interim:  Seen after 6/19  He has right shoulder tear, will be having surgery but A1c was high  Wife sick since 2016 has IIH  Back on  keto diet since 1/20    had bariatric surgery-sleeve gastrectomy , lost 90lb    Diagnosed with Type 2 diabetes mellitus in  1996  Known diabetic complications: none     Current diabetic medications     Metformin 1gm daily  50 units BID SSI 1 for 50 >150  -10 <80      Farxiga 5mg stopped due to yeast infection    Initial  Lantus 110 unit BID  Metformin 1000mg BID    Last A1c  8.5%<-----11.2%<----- 9.7%<-----8.4%<------6%<------9.4 on 10/17<------7.8 on 2/16<-----7.6 on 6/15<-----9.9 on 2/15<-----  8.0 on 11/14<----- 8.3 on 7/14<----10.6 on 3/14<---11.9 on 1/14<--- 9.4<---10.8    Prior visit with dietician: yes  Current diet: 0 Carbs low Isaiah  Current exercise: walking   Current monitoring regimen: home blood tests -2 times daily     Has brought blood glucose log/meter:no  Home blood sugar records:   Any episodes of hypoglycemia? Occ    No Hx of CAD , PVD, CVA  H/o pancreatitis 2006    Hyperlipidemia:  For years, tolerating medication  1/14 T.C 544 TG 3800 HDL 76   8/13     Fenofibrate 160 Lipitor 20mg    on 3/14 - he stopped fenofibrate and lipitor, now restarted    on 10/17  lipitor and fenofibrate      On 3/18   Did not have labs    on 7/19   On lipitor 40mg fenofibrate  Fish oil does not cover    Last eye exam:5/19  Last foot exam:6/19  Last microalbumin to creatinine ratio: 58 on 1/14---> 11/17    HTN: stable but now high  Lisinopril 20mg  ASA No  Smoking No    FH of diabetes, mom was diabetic, she was +600 units with no good control. TG was 31781. Sister does not have diabetes  Lost weight from 400 to 310 over 8 years    Past Medical History:   Diagnosis Date    Diabetes mellitus ([facility] [address]     GERD (gastroesophageal reflux disease)     minimal since gastric sleeve    Hyperlipidemia     Hypertension     Sleep apnea     does not use Cpap    Type II or unspecified type diabetes mellitus without mention of complication, not stated as uncontrolled     Wears glasses      Current Outpatient Medications   Medication Sig Dispense Refill    Icosapent Ethyl (VASCEPA) 1 g CAPS capsule Take 2 capsules by mouth 2 times daily 120 capsule 3    metFORMIN (GLUCOPHAGE) 1000 MG tablet TAKE 1 TABLET DAILY  WITH MEALS 90 tablet 1    fenofibrate 160 MG tablet TAKE 1 TABLET DAILY 90 tablet 1    docusate sodium (COLACE) 100 MG capsule Take 1 capsule by mouth 2 times daily (Patient taking differently: Take 100 mg by mouth as needed ) 60 capsule 0    lisinopril (PRINIVIL;ZESTRIL) 20 MG tablet TAKE 1 TABLET DAILY 90 tablet 3    atorvastatin (LIPITOR) 40 MG tablet TAKE 1 TABLET DAILY 90 tablet 2    insulin regular human (HUMULIN R U-500 KWIKPEN) 500 UNIT/ML SOPN concentrated injection pen 50 units twice a day 9 pen 2    Calcium-Magnesium-Vitamin D (CITRACAL CALCIUM+D PO) Take by mouth daily      aspirin 81 MG EC tablet Take 81 mg by mouth daily.  omega-3 acid ethyl esters (LOVAZA) 1 g capsule TAKE 1 CAPSULE DAILY (Patient not taking: Reported on 2/27/2020) 90 capsule 3     No current facility-administered medications for this visit. Review of Systems  Scanned reviewed total lost 150lb  14 lb loss since last visit    Objective: There were no vitals taken for this visit. Wt Readings from Last 3 Encounters:   12/13/19 270 lb (122.5 kg)   12/05/19 276 lb (125.2 kg)   12/02/19 276 lb (125.2 kg)     Constitutional: Well-developed, appears stated age, cooperative, in no acute distress  H/E/N/M/T:atraumatic, normocephalic, external ears, nose, lips normal without lesions  Eyes: Lids, lashes, conjunctivae and sclerae normal, No proptosis, no redness  Neck: supple, symmetrical, no swelling  Skin: No obvious rashes or lesions present.   Skin and hair texture normal  Psychiatric:

## 2020-05-10 ENCOUNTER — TELEPHONE (OUTPATIENT)
Dept: INTERNAL MEDICINE CLINIC | Age: 45
End: 2020-05-10

## 2020-05-10 RX ORDER — FLUTICASONE PROPIONATE 110 UG/1
2 AEROSOL, METERED RESPIRATORY (INHALATION) 2 TIMES DAILY
Qty: 1 INHALER | Refills: 3 | Status: SHIPPED | OUTPATIENT
Start: 2020-05-10 | End: 2021-05-10

## 2020-05-15 PROBLEM — R06.09 DOE (DYSPNEA ON EXERTION): Status: ACTIVE | Noted: 2020-05-15

## 2020-05-18 RX ORDER — ATORVASTATIN CALCIUM 40 MG/1
TABLET, FILM COATED ORAL
Qty: 90 TABLET | Refills: 2 | Status: SHIPPED | OUTPATIENT
Start: 2020-05-18 | End: 2020-06-24

## 2020-05-18 NOTE — TELEPHONE ENCOUNTER
Medication:   Requested Prescriptions     Pending Prescriptions Disp Refills    atorvastatin (LIPITOR) 40 MG tablet [Pharmacy Med Name: ATORVASTATIN 40MG TABLETS] 90 tablet 2     Sig: TAKE 1 TABLET BY MOUTH DAILY         Last appt: 2/27/2020   Next appt: 6/11/2020    Last OARRS:   RX Monitoring 2/12/2019   Attestation The Prescription Monitoring Report for this patient was reviewed today.

## 2020-05-27 ENCOUNTER — HOSPITAL ENCOUNTER (OUTPATIENT)
Dept: CT IMAGING | Age: 45
Discharge: HOME | End: 2020-05-27
Payer: COMMERCIAL

## 2020-05-27 LAB
CREAT SERPL-MCNC: 0.8 MG/DL (ref 0.9–1.3)
GFR AFRICAN AMERICAN: >60
GFR NON-AFRICAN AMERICAN: >60

## 2020-05-27 PROCEDURE — 6360000004 HC RX CONTRAST MEDICATION: Performed by: INTERNAL MEDICINE

## 2020-05-27 PROCEDURE — 36415 COLL VENOUS BLD VENIPUNCTURE: CPT

## 2020-05-27 PROCEDURE — 71260 CT THORAX DX C+: CPT

## 2020-05-27 PROCEDURE — 82565 ASSAY OF CREATININE: CPT

## 2020-05-27 RX ADMIN — IOPAMIDOL 100 ML: 755 INJECTION, SOLUTION INTRAVENOUS at 13:28

## 2020-06-11 ENCOUNTER — TELEMEDICINE (OUTPATIENT)
Dept: ENDOCRINOLOGY | Age: 45
End: 2020-06-11
Payer: COMMERCIAL

## 2020-06-11 PROCEDURE — 99214 OFFICE O/P EST MOD 30 MIN: CPT | Performed by: INTERNAL MEDICINE

## 2020-06-11 PROCEDURE — 3052F HG A1C>EQUAL 8.0%<EQUAL 9.0%: CPT | Performed by: INTERNAL MEDICINE

## 2020-06-11 RX ORDER — ICOSAPENT ETHYL 1000 MG/1
2 CAPSULE ORAL 2 TIMES DAILY
Qty: 120 CAPSULE | Refills: 3 | Status: SHIPPED
Start: 2020-06-11 | End: 2020-08-17 | Stop reason: CLARIF

## 2020-06-11 NOTE — PROGRESS NOTES
Seen as f/u patient for diabetes        Pursuant to the emergency declaration under the [facility] waiver authority and the [employer] Act, this Virtual  Visit was conducted, with patient's consent, to reduce the patient's risk of exposure to COVID-19 and provide continuity of care for an established patient. Patient was at home. Provider was at home. No one else was involved  Services were provided through a video synchronous discussion virtually to substitute for in-person clinic visit. Interim:    Checking glucose after meals at time  Few readings as has new meter      He has right shoulder tear, will be having surgery but A1c was high  Wife sick since 2016 has IIH  on  keto diet since 1/20    had bariatric surgery-sleeve gastrectomy , lost 90lb    Diagnosed with Type 2 diabetes mellitus in  1996  Known diabetic complications: none     Moderate, uncontrolled, worsened by high CHO    Current diabetic medications     Metformin 1gm daily  55 units BID SSI 1 for 50 >150  -10 <80      Farxiga 5mg stopped due to yeast infection    Initial  Lantus 110 unit BID  Metformin 1000mg BID    Last A1c  8.5%<-----11.2%<----- 9.7%<-----8.4%<------6%<------9.4 on 10/17<------7.8 on 2/16<-----7.6 on 6/15<-----9.9 on 2/15<-----  8.0 on 11/14<----- 8.3 on 7/14<----10.6 on 3/14<---11.9 on 1/14<--- 9.4<---10.8    Prior visit with dietician: yes  Current diet: 0 Carbs low Isaiah  Current exercise: walking   Current monitoring regimen: home blood tests -2 times daily     Has brought blood glucose log/meter:no  Home blood sugar records:   Any episodes of hypoglycemia? Occ    No Hx of CAD , PVD, CVA  H/o pancreatitis 2006    Hyperlipidemia:  For years, tolerating medication  1/14 T.C 544 TG 3800 HDL 76   8/13     Fenofibrate 160 Lipitor 20mg    on 3/14 - he stopped fenofibrate and lipitor, now restarted   increase am dose  If lows persist, switch back to basal bolus  2. HTN:At goal per patient, does not check at home  3. HLD:He has familial hyperlipidemia. Discussed dietary restriction, on fibrate and statin, added fish oil, TG higher. Advised low CHO instead of Keto diet as lipids are high. Lovaza not covered, check Vascepa, not covered  4. Obesity: s/p sleeve,gaining weight, advise loss    Plan:      U-500  Change to 55 units before breakfast and supper     SSI 5 for 50 > 150   Metformin 1gm Daily   Avoid GLP given pancreatitis history   Advise to check blood sugar 2 times a day   Patient to send blood sugar log for titration. Advise to exercise regularly. Advise to low simple carbohydrate and protein with each  meal diet. Diabetes Care: recommend yearly eye exam, foot exam and urine microalbumin to   creatinine ratio.  Patient is due    -Hyperlipidemia, LDL goal is <100 mg/dl    Once off insulin, will see PCP only

## 2020-06-24 RX ORDER — ROSUVASTATIN CALCIUM 40 MG/1
40 TABLET, COATED ORAL DAILY
Qty: 90 TABLET | Refills: 1 | Status: SHIPPED | OUTPATIENT
Start: 2020-06-24 | End: 2020-10-13 | Stop reason: SDUPTHER

## 2020-07-27 RX ORDER — PEN NEEDLE, DIABETIC 31 GX5/16"
NEEDLE, DISPOSABLE MISCELLANEOUS
Qty: 180 EACH | Refills: 3 | Status: SHIPPED | OUTPATIENT
Start: 2020-07-27

## 2020-07-27 RX ORDER — INSULIN HUMAN 500 [IU]/ML
INJECTION, SOLUTION SUBCUTANEOUS
Qty: 18 ML | Refills: 3 | Status: SHIPPED | OUTPATIENT
Start: 2020-07-27 | End: 2021-08-12

## 2020-08-10 PROBLEM — Z00.00 PREVENTATIVE HEALTH CARE: Status: ACTIVE | Noted: 2020-08-10

## 2020-09-09 ENCOUNTER — TELEPHONE (OUTPATIENT)
Dept: BARIATRICS/WEIGHT MGMT | Age: 45
End: 2020-09-09

## 2020-09-09 PROBLEM — Z00.00 PREVENTATIVE HEALTH CARE: Status: RESOLVED | Noted: 2020-08-10 | Resolved: 2020-09-09

## 2020-10-13 RX ORDER — ROSUVASTATIN CALCIUM 40 MG/1
40 TABLET, COATED ORAL DAILY
Qty: 90 TABLET | Refills: 1 | Status: SHIPPED | OUTPATIENT
Start: 2020-10-13 | End: 2020-10-15 | Stop reason: SDUPTHER

## 2020-10-13 NOTE — TELEPHONE ENCOUNTER
Medication:   Requested Prescriptions     Pending Prescriptions Disp Refills    rosuvastatin (CRESTOR) 40 MG tablet 90 tablet 1     Sig: Take 1 tablet by mouth daily       Last Filled:      Patient Phone Number: [phone] (home) [phone] (work)    Last appt: Visit date not found   Next appt: Visit date not found    Last Lipid:   Lab Results   Component Value Date    CHOL 230 06/23/2020    TRIG 307 06/23/2020    HDL 42 06/23/2020    HDL 34 03/06/2012    LDLCALC see below 06/23/2020

## 2020-10-14 ENCOUNTER — TELEPHONE (OUTPATIENT)
Dept: ENDOCRINOLOGY | Age: 45
End: 2020-10-14

## 2020-10-14 NOTE — TELEPHONE ENCOUNTER
[facility] is requesting the script for crestor be sent with 3 refills per insurance reasons [facility]

## 2020-10-15 RX ORDER — ROSUVASTATIN CALCIUM 40 MG/1
40 TABLET, COATED ORAL DAILY
Qty: 90 TABLET | Refills: 3 | Status: SHIPPED | OUTPATIENT
Start: 2020-10-15 | End: 2021-04-01 | Stop reason: SDUPTHER

## 2020-10-15 NOTE — TELEPHONE ENCOUNTER
Medication:   Requested Prescriptions     Pending Prescriptions Disp Refills    rosuvastatin (CRESTOR) 40 MG tablet 90 tablet 3     Sig: Take 1 tablet by mouth daily       Last Filled:      Patient Phone Number: [phone] (home) [phone] (work)    Last appt: Visit date not found   Next appt: Visit date not found    Last Lipid:   Lab Results   Component Value Date    CHOL 230 06/23/2020    TRIG 307 06/23/2020    HDL 42 06/23/2020    HDL 34 03/06/2012    LDLCALC see below 06/23/2020

## 2021-04-01 RX ORDER — ROSUVASTATIN CALCIUM 40 MG/1
40 TABLET, COATED ORAL DAILY
Qty: 90 TABLET | Refills: 0 | Status: SHIPPED | OUTPATIENT
Start: 2021-04-01

## 2021-04-15 ENCOUNTER — OFFICE VISIT (OUTPATIENT)
Dept: ENDOCRINOLOGY | Age: 46
End: 2021-04-15
Payer: COMMERCIAL

## 2021-04-15 VITALS
HEIGHT: 71 IN | RESPIRATION RATE: 18 BRPM | BODY MASS INDEX: 35.56 KG/M2 | OXYGEN SATURATION: 97 % | SYSTOLIC BLOOD PRESSURE: 122 MMHG | WEIGHT: 254 LBS | HEART RATE: 99 BPM | DIASTOLIC BLOOD PRESSURE: 80 MMHG

## 2021-04-15 DIAGNOSIS — I10 ESSENTIAL HYPERTENSION: ICD-10-CM

## 2021-04-15 DIAGNOSIS — E11.65 TYPE 2 DIABETES MELLITUS WITH HYPERGLYCEMIA, WITHOUT LONG-TERM CURRENT USE OF INSULIN (HCC): Primary | ICD-10-CM

## 2021-04-15 LAB — HBA1C MFR BLD: 12.2 %

## 2021-04-15 PROCEDURE — 83036 HEMOGLOBIN GLYCOSYLATED A1C: CPT | Performed by: INTERNAL MEDICINE

## 2021-04-15 PROCEDURE — 99214 OFFICE O/P EST MOD 30 MIN: CPT | Performed by: INTERNAL MEDICINE

## 2021-04-15 RX ORDER — FLASH GLUCOSE SENSOR
KIT MISCELLANEOUS
Qty: 2 EACH | Refills: 2 | Status: SHIPPED | OUTPATIENT
Start: 2021-04-15 | End: 2021-04-30 | Stop reason: SDUPTHER

## 2021-04-15 RX ORDER — FLASH GLUCOSE SCANNING READER
EACH MISCELLANEOUS
Qty: 1 EACH | Refills: 0 | Status: SHIPPED | OUTPATIENT
Start: 2021-04-15

## 2021-04-15 NOTE — PROGRESS NOTES
Seen as f/u patient for diabetes      Interim:    Checking glucose occasionally  Misses insulin 75 % times  Bad food choices      He has right shoulder tear, will be having surgery but A1c was high  Wife sick since 2016 has IIH  on  keto diet since 1/20    had bariatric surgery-sleeve gastrectomy , lost 90lb    Diagnosed with Type 2 diabetes mellitus in  1996  Known diabetic complications: none     Moderate, uncontrolled, worsened by high CHO    Current diabetic medications     Metformin 1gm daily  U-500  50 units BID SSI 1 for 50 >150  -10 <80      Farxiga 5mg stopped due to yeast infection    Initial  Lantus 110 unit BID  Metformin 1000mg BID    Last A1c 12.2%<---- 9.6%<----8.5%<-----11.2%<----- 9.7%<-----8.4%<------6%<------9.4 on 10/17<------7.8 on 2/16<-----7.6 on 6/15<-----9.9 on 2/15<-----  8.0 on 11/14<----- 8.3 on 7/14<----10.6 on 3/14<---11.9 on 1/14<--- 9.4<---10.8    Prior visit with dietician: yes  Current diet: 0 Carbs low Isaiah  Current exercise: walking   Current monitoring regimen: home blood tests -2 times daily     Has brought blood glucose log/meter:no  Home blood sugar records:   Any episodes of hypoglycemia? Occ    No Hx of CAD , PVD, CVA  H/o pancreatitis 2006    Hyperlipidemia:  For years, tolerating medication  1/14 T.C 544 TG 3800 HDL 76   8/13     Fenofibrate 160 Lipitor 20mg    on 3/14 - he stopped fenofibrate and lipitor, now restarted    on 10/17  lipitor and fenofibrate      On 3/18   Did not have labs    on 7/19   On lipitor 40mg fenofibrate  Fish oil not cover  6/20     Change lipitor to crestor 40mg      Last eye exam:5/19  Due  Last foot exam:4/21  Last microalbumin to creatinine ratio: 58 on 1/14---> 11/17---> 6.20    HTN: stable but now high  Lisinopril 20mg  ASA No  Smoking No    FH of diabetes, mom was diabetic, she was +600 units with no good control. TG was 87639. Sister does not have diabetes  Lost weight from 400 to 310 over 8 years    Past Medical History:   Diagnosis Date    Diabetes mellitus ([facility] [address]     GERD (gastroesophageal reflux disease)     minimal since gastric sleeve    Hyperlipidemia     Hypertension     Sleep apnea     does not use Cpap    Type II or unspecified type diabetes mellitus without mention of complication, not stated as uncontrolled     Wears glasses      Current Outpatient Medications   Medication Sig Dispense Refill    rosuvastatin (CRESTOR) 40 MG tablet Take 1 tablet by mouth daily 90 tablet 0    fenofibrate (TRIGLIDE) 160 MG tablet TAKE 1 TABLET BY MOUTH DAILY 90 tablet 1    metFORMIN (GLUCOPHAGE) 1000 MG tablet TAKE 1 TABLET BY MOUTH DAILY WITH A MEAL 90 tablet 1    lisinopril (PRINIVIL;ZESTRIL) 20 MG tablet TAKE 1 TABLET BY MOUTH DAILY 90 tablet 3    B-D UF III MINI PEN NEEDLES 31G X 5 MM MISC INJECT AND DISCARD 1 PEN NEEDLE TWO TIMES A  each 3    insulin regular human (HUMULIN R U-500 KWIKPEN) 500 UNIT/ML SOPN concentrated injection pen INJECT 50 UNITS SUBCUTANEOUSLY TWO TIMES A DAY 18 mL 3    albuterol sulfate HFA (PROAIR HFA) 108 (90 Base) MCG/ACT inhaler Inhale 2 puffs into the lungs every 4 hours as needed for Wheezing 1 Inhaler 3    fluticasone (FLOVENT HFA) 110 MCG/ACT inhaler Inhale 2 puffs into the lungs 2 times daily 1 Inhaler 3    Calcium-Magnesium-Vitamin D (CITRACAL CALCIUM+D PO) Take by mouth daily      aspirin 81 MG EC tablet Take 81 mg by mouth daily. No current facility-administered medications for this visit.         Review of Systems    Scanned, reviewed    Vitals:    04/15/21 1724   BP: 122/80   Pulse: 99   Resp: 18   SpO2: 97%     Constitutional: Well-developed, appears stated age, cooperative, in no acute distress  H/E/N/M/T:atraumatic, normocephalic, external ears, nose, lips normal without lesions  Eyes: Lids, lashes, conjunctivae and sclerae normal, No proptosis, no redness  Neck: supple, symmetrical, no swelling  Skin: No obvious rashes or lesions present. Skin and hair texture normal  Psychiatric: Judgement and Insight:  judgement and insight appear normal  Neuro: Normal without focal findings, speech is normal normal, speech is spontaneous  Chest: No labored breathing, no chest deformity, no stridor  Musculoskeletal: No joint deformity, swelling      4/21  Skeletal foot exam is normal, no skin lesions, 10 g monofilament is 10/10 on the right and 10/10 on the left  Reduced on big toe    Lab Reviewed   No components found for: CHLPL  Lab Results   Component Value Date    TRIG 307 (H) 06/23/2020    TRIG 135 03/15/2018    TRIG 1,060 (H) 12/01/2017     Lab Results   Component Value Date    HDL 42 06/23/2020    HDL 36 (L) 07/19/2019    HDL 40 03/15/2018     Lab Results   Component Value Date    LDLCALC see below 06/23/2020    LDLCALC see below 07/19/2019    LDLCALC 130 (H) 03/15/2018     Lab Results   Component Value Date    LABVLDL see below 06/23/2020    LABVLDL see below 07/19/2019    LABVLDL 27 03/15/2018     Lab Results   Component Value Date    LABA1C 9.6 06/23/2020       Assessment:     Saige Gay is a 41 y.o. male with :    1.T2DM:Insulin resistant secondary to obesity, + FH. Avoid GLP agonist given pancreatitis,  switched back to U-500. Lost to f/u, A1c very high. Needs to check glucose before meals. Advised adherence. Discussed alternative of basal once a day and rapid acting as needed, he will discus with his wife. If lows persist, switch back to basal bolus  Check CGM coverage for better monitoring  2. HTN:At goal  3. HLD:He has familial hyperlipidemia. Discussed dietary restriction, on fibrate and statin, added fish oil, TG higher. Advised low CHO instead of Keto diet as lipids are high. Lovaza not covered, check Vascepa, not covered  4. Obesity: s/p sleeve,gaining weight, advise loss    Plan:      U-500  Change to 55 units before breakfast and supper     SSI 5 for 50 > 150   Metformin 1gm Daily   Avoid GLP given pancreatitis history   Advise to check blood sugar 2 times a day   Patient to send blood sugar log for titration. Advise to exercise regularly. Advise to low simple carbohydrate and protein with each  meal diet. Diabetes Care: recommend yearly eye exam, foot exam and urine microalbumin to   creatinine ratio.  Patient is due    -Hyperlipidemia, LDL goal is <100 mg/dl    Once off insulin, will see PCP only

## 2021-04-29 ENCOUNTER — PATIENT MESSAGE (OUTPATIENT)
Dept: ENDOCRINOLOGY | Age: 46
End: 2021-04-29

## 2021-04-30 RX ORDER — FLASH GLUCOSE SENSOR
KIT MISCELLANEOUS
Qty: 2 EACH | Refills: 2 | Status: SHIPPED | OUTPATIENT
Start: 2021-04-30

## 2021-04-30 NOTE — TELEPHONE ENCOUNTER
From: Lindsay Ingram  To: Ulysses Brock MD  Sent: 4/29/2021 4:07 PM EDT  Subject: Prescription Question    I'm having issues getting the Freestyle Eunice 2 and sensors. First my insurance said yes, sure. Now I'm being told they don't cover the sensors. A 3 mo supply (6 sensors) is not covered and will cost $398. Insurance covers dexcom reader and sensors (their preferred item). I may be able to get a free sensor from [facility] but I need a prescription sent to them.     [address]  Pharmacy Phone: [phone]      [facility]

## 2021-08-12 ENCOUNTER — OFFICE VISIT (OUTPATIENT)
Dept: ENDOCRINOLOGY | Age: 46
End: 2021-08-12
Payer: COMMERCIAL

## 2021-08-12 VITALS
SYSTOLIC BLOOD PRESSURE: 156 MMHG | HEIGHT: 71 IN | BODY MASS INDEX: 38.89 KG/M2 | OXYGEN SATURATION: 98 % | DIASTOLIC BLOOD PRESSURE: 92 MMHG | HEART RATE: 90 BPM | WEIGHT: 277.8 LBS

## 2021-08-12 DIAGNOSIS — E11.65 TYPE 2 DIABETES MELLITUS WITH HYPERGLYCEMIA, WITHOUT LONG-TERM CURRENT USE OF INSULIN (HCC): Primary | ICD-10-CM

## 2021-08-12 LAB — HBA1C MFR BLD: 7.6 %

## 2021-08-12 PROCEDURE — 83036 HEMOGLOBIN GLYCOSYLATED A1C: CPT | Performed by: INTERNAL MEDICINE

## 2021-08-12 PROCEDURE — 3051F HG A1C>EQUAL 7.0%<8.0%: CPT | Performed by: INTERNAL MEDICINE

## 2021-08-12 PROCEDURE — 99214 OFFICE O/P EST MOD 30 MIN: CPT | Performed by: INTERNAL MEDICINE

## 2021-08-12 RX ORDER — INSULIN GLARGINE 100 [IU]/ML
INJECTION, SOLUTION SUBCUTANEOUS
Qty: 10 PEN | Refills: 3 | Status: SHIPPED | OUTPATIENT
Start: 2021-08-12 | End: 2021-08-13 | Stop reason: SDUPTHER

## 2021-08-12 RX ORDER — INSULIN LISPRO 100 [IU]/ML
INJECTION, SOLUTION INTRAVENOUS; SUBCUTANEOUS
Qty: 5 PEN | Refills: 2 | Status: SHIPPED | OUTPATIENT
Start: 2021-08-12 | End: 2021-08-13 | Stop reason: SDUPTHER

## 2021-08-12 NOTE — PROGRESS NOTES
Seen as f/u patient for diabetes      Interim:    Using Dexcom  He was taking insulin twice a day   Interested in  insulin pump  Reports lows so decreased insulin dose to once a day 3-4 days ago    He has right shoulder tear, will be having surgery but A1c was high  Wife sick since 2016 has IIH  on  keto diet since 1/20    had bariatric surgery-sleeve gastrectomy , lost 90lb    Diagnosed with Type 2 diabetes mellitus in  1996  Known diabetic complications: none     Moderate, uncontrolled, worsened by high CHO    Current diabetic medications     Metformin 1gm daily  U-500  55 units BID SSI 1 for 50 >150  -10 <80      Farxiga 5mg stopped due to yeast infection    Initial  Lantus 110 unit BID  Metformin 1000mg BID    Last A1c 7.6%<------12.2%<---- 9.6%<----8.5%<-----11.2%<----- 9.7%<-----8.4%<------6%<------9.4 on 10/17<------7.8 on 2/16<-----7.6 on 6/15<-----9.9 on 2/15<-----  8.0 on 11/14<----- 8.3 on 7/14<----10.6 on 3/14<---11.9 on 1/14<--- 9.4<---10.8    Prior visit with dietician: yes  Current diet: 0 Carbs low Isaiah  Current exercise: walking   Current monitoring regimen: home blood tests  Uses CGM    Average  212      Any episodes of hypoglycemia? Occ    No Hx of CAD , PVD, CVA  H/o pancreatitis 2006    Hyperlipidemia:  For years, tolerating medication  1/14 T.C 544 TG 3800 HDL 76   8/13     Fenofibrate 160 Lipitor 20mg    on 3/14 - he stopped fenofibrate and lipitor, now restarted    on 10/17  lipitor and fenofibrate      On 3/18   Did not have labs    on 7/19   On lipitor 40mg fenofibrate  Fish oil not cover  6/20     Change lipitor to crestor 40mg      Last eye exam:5/19  Due  Last foot exam:4/21  Last microalbumin to creatinine ratio: 58 on 1/14---> 11/17---> 6.20    HTN: stable but now high  Lisinopril 20mg  ASA No  Smoking No    FH of diabetes, mom was diabetic, she was +600 units with no good control. TG was 37253. Sister does normocephalic, external ears, nose, lips normal without lesions  Eyes: Lids, lashes, conjunctivae and sclerae normal, No proptosis, no redness  Neck: supple, symmetrical, no swelling  Skin: No obvious rashes or lesions present. Skin and hair texture normal  Psychiatric: Judgement and Insight:  judgement and insight appear normal  Neuro: Normal without focal findings, speech is normal normal, speech is spontaneous  Chest: No labored breathing, no chest deformity, no stridor  Musculoskeletal: No joint deformity, swelling      4/21  Skeletal foot exam is normal, no skin lesions, 10 g monofilament is 10/10 on the right and 10/10 on the left  Reduced on big toe    Lab Reviewed   No components found for: CHLPL  Lab Results   Component Value Date    TRIG 307 (H) 06/23/2020    TRIG 135 03/15/2018    TRIG 1,060 (H) 12/01/2017     Lab Results   Component Value Date    HDL 42 06/23/2020    HDL 36 (L) 07/19/2019    HDL 40 03/15/2018     Lab Results   Component Value Date    LDLCALC see below 06/23/2020    LDLCALC see below 07/19/2019    LDLCALC 130 (H) 03/15/2018     Lab Results   Component Value Date    LABVLDL see below 06/23/2020    LABVLDL see below 07/19/2019    LABVLDL 27 03/15/2018     Lab Results   Component Value Date    LABA1C 12.2 04/15/2021       Assessment:     Myah Pathak is a 41 y.o. male with :    1.T2DM:Insulin resistant secondary to obesity, + FH. Avoid GLP agonist given pancreatitis,  switched back to U-500. A1c better. He has lows now, advised to switch to basal and use rapid acting as needed. If requirement a lot of humalog, plan for insulin pump. 2.HTN:At goal  3. HLD:He has familial hyperlipidemia. Discussed dietary restriction, on fibrate and statin, added fish oil, TG higher. Advised low CHO instead of Keto diet as lipids are high. Lovaza not covered, check Vascepa, not covered  4. Obesity: s/p sleeve,gaining weight, advise loss    Plan:      Stop U-500   Lantus 80 units   Humalog SSI 2 for 50 >150 If eating > 20 gram CHO, take 10 units   Metformin 1gm Daily   Avoid GLP given pancreatitis history   Advise to check blood sugar 2 times a day   Patient to send blood sugar log for titration. Advise to exercise regularly. Advise to low simple carbohydrate and protein with each  meal diet. Diabetes Care: recommend yearly eye exam, foot exam and urine microalbumin to   creatinine ratio.  Patient is due    -Hyperlipidemia, LDL goal is <100 mg/dl    Once off insulin, will see PCP only

## 2021-08-13 ENCOUNTER — PATIENT MESSAGE (OUTPATIENT)
Dept: ENDOCRINOLOGY | Age: 46
End: 2021-08-13

## 2021-08-13 RX ORDER — INSULIN LISPRO 100 [IU]/ML
INJECTION, SOLUTION INTRAVENOUS; SUBCUTANEOUS
Qty: 5 PEN | Refills: 2 | Status: SHIPPED | OUTPATIENT
Start: 2021-08-13 | End: 2021-08-17

## 2021-08-13 RX ORDER — INSULIN GLARGINE 100 [IU]/ML
INJECTION, SOLUTION SUBCUTANEOUS
Qty: 10 PEN | Refills: 3 | Status: SHIPPED | OUTPATIENT
Start: 2021-08-13 | End: 2021-08-27

## 2021-08-13 NOTE — TELEPHONE ENCOUNTER
From: Nicolle Holloway  To: Denver Hakim, MD  Sent: 8/13/2021 9:15 AM EDT  Subject: Prescription Question    Could you please resend the insulin prescriptions to [facility] please? Lantus and the Humalog. Andrew Jeffries could not process the order since Aug 1st my insurance changed to [employer].      Thank you,   Mat Meyer

## 2021-08-15 ENCOUNTER — PATIENT MESSAGE (OUTPATIENT)
Dept: ENDOCRINOLOGY | Age: 46
End: 2021-08-15

## 2021-08-15 DIAGNOSIS — E11.65 TYPE 2 DIABETES MELLITUS WITH HYPERGLYCEMIA, WITHOUT LONG-TERM CURRENT USE OF INSULIN (HCC): Primary | ICD-10-CM

## 2021-08-17 RX ORDER — INSULIN ASPART 100 [IU]/ML
INJECTION, SOLUTION INTRAVENOUS; SUBCUTANEOUS
Qty: 5 PEN | Refills: 3 | Status: SHIPPED | OUTPATIENT
Start: 2021-08-17

## 2021-08-17 NOTE — TELEPHONE ENCOUNTER
From: Nicolle Holloway  To: Denver Hakim, MD  Sent: 8/15/2021 4:39 PM EDT  Subject: Prescription Question    Is there another Humalog like insulin I can use? Apparently my insurance would rather see me suffer then cover it. They canceled my order without saying a word to me, it's only because I keep checking the status that I found out about it. Canceled  We can't fill your INSULIN LISPRO(BRAND)[id number] prescription because your prescription plan does not cover this medicine. You can ask your doctor to prescribe an alternative medicine that your plan covers.     Mat Meyer

## 2021-08-20 RX ORDER — INSULIN ASPART 100 [IU]/ML
INJECTION, SOLUTION INTRAVENOUS; SUBCUTANEOUS
Qty: 5 PEN | Refills: 3 | Status: SHIPPED | OUTPATIENT
Start: 2021-08-20 | End: 2021-08-23 | Stop reason: SDUPTHER

## 2021-08-20 NOTE — TELEPHONE ENCOUNTER
Requested Prescriptions     Pending Prescriptions Disp Refills    insulin aspart (NOVOLOG FLEXPEN) 100 UNIT/ML injection pen 5 pen 3     Si/10 units AC TID     Prescription was sent to [facility] in error. Please resubmit to [facility].

## 2021-08-23 RX ORDER — INSULIN ASPART 100 [IU]/ML
INJECTION, SOLUTION INTRAVENOUS; SUBCUTANEOUS
Qty: 5 PEN | Refills: 3 | Status: SHIPPED | OUTPATIENT
Start: 2021-08-23 | End: 2021-08-27 | Stop reason: SDUPTHER

## 2021-08-27 DIAGNOSIS — E11.65 TYPE 2 DIABETES MELLITUS WITH HYPERGLYCEMIA, WITHOUT LONG-TERM CURRENT USE OF INSULIN (HCC): ICD-10-CM

## 2021-08-27 RX ORDER — INSULIN ASPART 100 [IU]/ML
INJECTION, SOLUTION INTRAVENOUS; SUBCUTANEOUS
Qty: 5 PEN | Refills: 3 | Status: SHIPPED | OUTPATIENT
Start: 2021-08-27

## 2021-08-27 RX ORDER — INSULIN GLARGINE 100 [IU]/ML
INJECTION, SOLUTION SUBCUTANEOUS
Qty: 8 VIAL | Refills: 3 | Status: SHIPPED | OUTPATIENT
Start: 2021-08-27

## 2021-08-27 RX ORDER — PEN NEEDLE, DIABETIC 29 GAUGE
1 NEEDLE, DISPOSABLE MISCELLANEOUS 3 TIMES DAILY
Qty: 300 EACH | Refills: 3 | Status: SHIPPED | OUTPATIENT
Start: 2021-08-27

## 2021-08-30 ENCOUNTER — TELEPHONE (OUTPATIENT)
Dept: ENDOCRINOLOGY | Age: 46
End: 2021-08-30

## 2021-08-30 NOTE — TELEPHONE ENCOUNTER
Pharmacy is requesting the maximum daily dose for patient's insulin aspart (NOVOLOG FLEXPEN) 100 UNIT/ML injection pen             [address] , [address] [address]   Phone:  [phone]  Fax:  [phone]

## 2021-08-31 ENCOUNTER — TELEPHONE (OUTPATIENT)
Dept: ENDOCRINOLOGY | Age: 46
End: 2021-08-31

## 2021-08-31 NOTE — TELEPHONE ENCOUNTER
Patient needs B-D 29G pen needles ([facility] does not have [employer] brand) for the Lantus.   Pharmacy needs clarification for max daily dose of patient's insulin aspart (NOVOLOG FLEXPEN) 100 UNIT/ML injection pen patient also needs a refill of his   rosuvastatin (CRESTOR) 40 MG tablet                 [address], [address] [place] [address]   Phone:  [phone]  Fax:  [phone]

## 2021-12-30 ENCOUNTER — OFFICE VISIT (OUTPATIENT)
Dept: ENDOCRINOLOGY | Age: 46
End: 2021-12-30
Payer: COMMERCIAL

## 2021-12-30 VITALS
WEIGHT: 271.4 LBS | HEIGHT: 71 IN | HEART RATE: 82 BPM | BODY MASS INDEX: 37.99 KG/M2 | OXYGEN SATURATION: 98 % | DIASTOLIC BLOOD PRESSURE: 78 MMHG | SYSTOLIC BLOOD PRESSURE: 142 MMHG

## 2021-12-30 DIAGNOSIS — I10 PRIMARY HYPERTENSION: ICD-10-CM

## 2021-12-30 DIAGNOSIS — E11.65 TYPE 2 DIABETES MELLITUS WITH HYPERGLYCEMIA, WITHOUT LONG-TERM CURRENT USE OF INSULIN (HCC): Primary | ICD-10-CM

## 2021-12-30 LAB — HBA1C MFR BLD: 7.1 %

## 2021-12-30 PROCEDURE — 99214 OFFICE O/P EST MOD 30 MIN: CPT | Performed by: INTERNAL MEDICINE

## 2021-12-30 PROCEDURE — 83036 HEMOGLOBIN GLYCOSYLATED A1C: CPT | Performed by: INTERNAL MEDICINE

## 2021-12-30 PROCEDURE — 3051F HG A1C>EQUAL 7.0%<8.0%: CPT | Performed by: INTERNAL MEDICINE

## 2021-12-30 PROCEDURE — 95251 CONT GLUC MNTR ANALYSIS I&R: CPT | Performed by: INTERNAL MEDICINE

## 2021-12-30 NOTE — PROGRESS NOTES
Seen as f/u patient for diabetes      Interim:    Using Dexcom  Changed diet, taking less insulin  15 lb weight loss  Wife had revision bariatric surgery    Interested in  insulin pump      He has right shoulder tear, will be having surgery but A1c was high  Wife sick since 2016 has IIH  on  keto diet since 1/20    had bariatric surgery-sleeve gastrectomy , lost 90lb    Diagnosed with Type 2 diabetes mellitus in  1996  Known diabetic complications: none     Moderate, uncontrolled, worsened by high CHO    Current diabetic medications     Metformin 1gm daily  Lantus 80 units daily but may take after 2-3 days  Humalog SSI 2 for 50 >150   If eating > 20 gram CHO, take 10 units    Previous:    U-500  55 units BID SSI 1 for 50 >150  -10 <80      Farxiga 5mg stopped due to yeast infection    Initial  Lantus 110 unit BID  Metformin 1000mg BID    Last A1c 7.1%<------7.6%<------12.2%<---- 9.6%<----8.5%<-----11.2%<----- 9.7%<-----8.4%<------6%<------9.4 on 10/17<------7.8 on 2/16<-----7.6 on 6/15<-----9.9 on 2/15<-----  8.0 on 11/14<----- 8.3 on 7/14<----10.6 on 3/14<---11.9 on 1/14<--- 9.4<---10.8    Prior visit with dietician: yes  Current diet: 0 Carbs low Isaiah  Current exercise: walking   Current monitoring regimen: home blood tests  Uses CGM    Average  161    No significant lows  occ high glucose during the day    Any episodes of hypoglycemia? Occ    No Hx of CAD , PVD, CVA  H/o pancreatitis 2006    Hyperlipidemia:  For years, tolerating medication  1/14 T.C 544 TG 3800 HDL 76   8/13     Fenofibrate 160 Lipitor 20mg    on 3/14 - he stopped fenofibrate and lipitor, now restarted    on 10/17  lipitor and fenofibrate      On 3/18   Did not have labs    on 7/19   On lipitor 40mg fenofibrate  Fish oil not cover  6/20     Change lipitor to crestor 40mg   TG 83 on 8/21    Last eye exam:5/19  Due  Last foot exam:4/21  Last microalbumin to creatinine ratio: 58 on 1/14---> 11/17---> 6.20---> 8.21    HTN: stable but now high  Lisinopril 20mg  ASA No  Smoking No    FH of diabetes, mom was diabetic, she was +600 units with no good control. TG was 50550. Sister does not have diabetes  Lost weight from 400 to 56 over 8 years    Past Medical History:   Diagnosis Date    Diabetes mellitus ([facility] [address]     GERD (gastroesophageal reflux disease)     minimal since gastric sleeve    Hyperlipidemia     Hypertension     Sleep apnea     does not use Cpap    Type II or unspecified type diabetes mellitus without mention of complication, not stated as uncontrolled     Wears glasses      Current Outpatient Medications   Medication Sig Dispense Refill    lisinopril (PRINIVIL;ZESTRIL) 20 MG tablet TAKE 1 TABLET BY MOUTH DAILY 90 tablet 3    fenofibrate (TRIGLIDE) 160 MG tablet TAKE 1 TABLET BY MOUTH DAILY .  GENERIC EQUIVALENT FOR TRIGLIDE 90 tablet 3    metFORMIN (GLUCOPHAGE) 1000 MG tablet TAKE 1 TABLET BY MOUTH DAILY WITH A MEAL 90 tablet 3    insulin glargine (LANTUS) 100 UNIT/ML injection vial 80 units daily 8 vial 3    insulin aspart (NOVOLOG FLEXPEN) 100 UNIT/ML injection pen 2/10 units AC TID 5 pen 3    Insulin Pen Needle ([employer] PEN NEEDLES 29G) 29G X 12MM MISC 1 each by Does not apply route 3 times daily 300 each 3    insulin aspart (NOVOLOG FLEXPEN) 100 UNIT/ML injection pen 2-10 units AC TID 5 pen 3    Continuous Blood Gluc Sensor (FREESTYLE MYRA 2 SENSOR) MISC Every 2 weeks 2 each 2    Continuous Blood Gluc  (FREESTYLE MYRA 2 READER) MICHAEL As needed 1 each 0    rosuvastatin (CRESTOR) 40 MG tablet Take 1 tablet by mouth daily 90 tablet 0    B-D UF III MINI PEN NEEDLES 31G X 5 MM MISC INJECT AND DISCARD 1 PEN NEEDLE TWO TIMES A  each 3    albuterol sulfate HFA (PROAIR HFA) 108 (90 Base) MCG/ACT inhaler Inhale 2 puffs into the lungs every 4 hours as needed for Wheezing 1 Inhaler 3    Calcium-Magnesium-Vitamin D (CITRACAL CALCIUM+D PO) Take by mouth daily      aspirin 81 MG EC tablet Take 81 mg by mouth daily.  fluticasone (FLOVENT HFA) 110 MCG/ACT inhaler Inhale 2 puffs into the lungs 2 times daily 1 Inhaler 3     No current facility-administered medications for this visit. Review of Systems    Scanned, reviewed    Vitals:    12/30/21 0823   BP: (!) 142/78   Pulse: 82   SpO2: 98%     Constitutional: Well-developed, appears stated age, cooperative, in no acute distress  H/E/N/M/T:atraumatic, normocephalic, external ears, nose, lips normal without lesions  Eyes: Lids, lashes, conjunctivae and sclerae normal, No proptosis, no redness  Neck: supple, symmetrical, no swelling  Skin: No obvious rashes or lesions present. Skin and hair texture normal  Psychiatric: Judgement and Insight:  judgement and insight appear normal  Neuro: Normal without focal findings, speech is normal normal, speech is spontaneous  Chest: No labored breathing, no chest deformity, no stridor  Musculoskeletal: No joint deformity, swelling      4/21  Skeletal foot exam is normal, no skin lesions, 10 g monofilament is 10/10 on the right and 10/10 on the left  Reduced on big toe    Lab Reviewed   No components found for: CHLPL  Lab Results   Component Value Date    TRIG 83 08/30/2021    TRIG 307 (H) 06/23/2020    TRIG 135 03/15/2018     Lab Results   Component Value Date    HDL 58 08/30/2021    HDL 42 06/23/2020    HDL 36 (L) 07/19/2019     Lab Results   Component Value Date    LDLCALC 124 (H) 08/30/2021    LDLCALC see below 06/23/2020    LDLCALC see below 07/19/2019     Lab Results   Component Value Date    LABVLDL 17 08/30/2021    LABVLDL see below 06/23/2020    LABVLDL see below 07/19/2019     Lab Results   Component Value Date    LABA1C 7.6 08/12/2021       Assessment:     Jaqui Wilhelm is a 40 y.o. male with :    1.T2DM:Insulin resistant secondary to obesity, + FH. Avoid GLP agonist given pancreatitis. Taking less insulin as changed diet  A1c better.  Using lantus only, not taking daily as had lows. Will adjust to once a day , lower dose  If requires a lot of humalog, plan for insulin pump. 2.HTN:Near goal  3. HLD:He has familial hyperlipidemia. Discussed dietary restriction, on fibrate and statin, added fish oil, TG higher. Advised low CHO instead of Keto diet as lipids are high. Lovaza not covered, check Vascepa, not covered  4. Obesity: s/p sleeve,gaining weight, advise loss    Plan:      Lantus 36 units daily   Humalog SSI 2 for 50 >150   Metformin 1gm Daily   Avoid GLP given pancreatitis history   Advise to check blood sugar 2 times a day   Patient to send blood sugar log for titration. Advise to exercise regularly. Advise to low simple carbohydrate and protein with each  meal diet. Diabetes Care: recommend yearly eye exam, foot exam and urine microalbumin to   creatinine ratio.  Patient is due    -Hyperlipidemia, LDL goal is <100 mg/dl    Once off insulin, will see PCP only

## 2022-05-03 ENCOUNTER — OFFICE VISIT (OUTPATIENT)
Dept: ENDOCRINOLOGY | Age: 47
End: 2022-05-03
Payer: COMMERCIAL

## 2022-05-03 VITALS
HEART RATE: 86 BPM | OXYGEN SATURATION: 97 % | WEIGHT: 241.8 LBS | DIASTOLIC BLOOD PRESSURE: 92 MMHG | HEIGHT: 71 IN | BODY MASS INDEX: 33.85 KG/M2 | SYSTOLIC BLOOD PRESSURE: 144 MMHG

## 2022-05-03 DIAGNOSIS — E11.65 TYPE 2 DIABETES MELLITUS WITH HYPERGLYCEMIA, WITHOUT LONG-TERM CURRENT USE OF INSULIN (HCC): Primary | ICD-10-CM

## 2022-05-03 DIAGNOSIS — E78.49 OTHER HYPERLIPIDEMIA: ICD-10-CM

## 2022-05-03 LAB — HBA1C MFR BLD: 6.3 %

## 2022-05-03 PROCEDURE — 83036 HEMOGLOBIN GLYCOSYLATED A1C: CPT | Performed by: INTERNAL MEDICINE

## 2022-05-03 PROCEDURE — 99214 OFFICE O/P EST MOD 30 MIN: CPT | Performed by: INTERNAL MEDICINE

## 2022-05-03 PROCEDURE — 95251 CONT GLUC MNTR ANALYSIS I&R: CPT | Performed by: INTERNAL MEDICINE

## 2022-05-03 NOTE — PROGRESS NOTES
Seen as f/u patient for diabetes      Interim:    Using Dexcom  Changed diet, taking less insulin  Metformin diarrhea  30 lb loss    Interested in  insulin pump      He has right shoulder tear, will be having surgery but A1c was high  Wife sick since 2016 has IIH  on  keto diet since 1/20    had bariatric surgery-sleeve gastrectomy , lost 90lb    Diagnosed with Type 2 diabetes mellitus in  1996  Known diabetic complications: none     Moderate, uncontrolled, worsened by high CHO    Current diabetic medications     Metformin 1gm daily stopped for 2 months  Did not tolertate ER    Lantus 30 units  Humalog SSI 2 for 50 >150   If eating > 20 gram CHO, take 10 units    Previous:    U-500  55 units BID SSI 1 for 50 >150  -10 <80      Farxiga 5mg stopped due to yeast infection    Initial  Lantus 110 unit BID  Metformin 1000mg BID    Last A1c 6.3%<-----7.1%<------7.6%<------12.2%<---- 9.6%<----8.5%<-----11.2%<----- 9.7%<-----8.4%<------6%<------9.4 on 10/17<------7.8 on 2/16<-----7.6 on 6/15<-----9.9 on 2/15<-----  8.0 on 11/14<----- 8.3 on 7/14<----10.6 on 3/14<---11.9 on 1/14<--- 9.4<---10.8    Prior visit with dietician: yes  Current diet: 0 Carbs low Isaiah  Current exercise: walking   Current monitoring regimen: home blood tests  Uses CGM    Average  126    No significant lows  occ high glucose during the day    Any episodes of hypoglycemia? Occ    No Hx of CAD , PVD, CVA  H/o pancreatitis 2006    Hyperlipidemia:  For years, tolerating medication  1/14 T.C 544 TG 3800 HDL 76   8/13     Fenofibrate 160 Lipitor 20mg    on 3/14 - he stopped fenofibrate and lipitor, now restarted    on 10/17  lipitor and fenofibrate      On 3/18   Did not have labs    on 7/19   On lipitor 40mg fenofibrate  Fish oil not cover  6/20     Change lipitor to crestor 40mg   TG 83 on 8/21    Last eye exam:5/19  Due  Last foot exam:4/21  Last microalbumin to creatinine ratio: 58 on 1/14---> 11/17---> 6.20---> 8.21    HTN: stable but now high  Lisinopril 20mg  ASA No  Smoking No    FH of diabetes, mom was diabetic, she was +600 units with no good control. TG was 92556. Sister does not have diabetes  Lost weight from 400 to 56 over 8 years    Past Medical History:   Diagnosis Date    Diabetes mellitus ([facility] [address]     GERD (gastroesophageal reflux disease)     minimal since gastric sleeve    Hyperlipidemia     Hypertension     Sleep apnea     does not use Cpap    Type II or unspecified type diabetes mellitus without mention of complication, not stated as uncontrolled     Wears glasses      Current Outpatient Medications   Medication Sig Dispense Refill    sildenafil (VIAGRA) 50 MG tablet TAKE 1 TABLET BY MOUTH DAILY AS NEEDED FOR ERECTILE DYSFUNCTION. 8 tablet 2    Insulin Syringe-Needle U-100 30G X 1/2\" 0.5 ML MISC 1 each by Does not apply route daily 100 each 3    lisinopril (PRINIVIL;ZESTRIL) 20 MG tablet TAKE 1 TABLET BY MOUTH DAILY 90 tablet 3    fenofibrate (TRIGLIDE) 160 MG tablet TAKE 1 TABLET BY MOUTH DAILY .  GENERIC EQUIVALENT FOR TRIGLIDE 90 tablet 3    metFORMIN (GLUCOPHAGE) 1000 MG tablet TAKE 1 TABLET BY MOUTH DAILY WITH A MEAL 90 tablet 3    insulin glargine (LANTUS) 100 UNIT/ML injection vial 80 units daily 8 vial 3    insulin aspart (NOVOLOG FLEXPEN) 100 UNIT/ML injection pen 2/10 units AC TID 5 pen 3    Insulin Pen Needle ([employer] PEN NEEDLES 29G) 29G X 12MM MISC 1 each by Does not apply route 3 times daily 300 each 3    insulin aspart (NOVOLOG FLEXPEN) 100 UNIT/ML injection pen 2-10 units AC TID 5 pen 3    Continuous Blood Gluc Sensor (FREESTYLE MYRA 2 SENSOR) MISC Every 2 weeks 2 each 2    Continuous Blood Gluc  (FREESTYLE MYRA 2 READER) MICHAEL As needed 1 each 0    rosuvastatin (CRESTOR) 40 MG tablet Take 1 tablet by mouth daily 90 tablet 0    B-D UF III MINI PEN NEEDLES 31G X 5 MM MISC INJECT AND DISCARD 1 PEN NEEDLE TWO TIMES A  each 3    albuterol sulfate HFA (PROAIR HFA) 108 (90 Base) MCG/ACT inhaler Inhale 2 puffs into the lungs every 4 hours as needed for Wheezing 1 Inhaler 3    Calcium-Magnesium-Vitamin D (CITRACAL CALCIUM+D PO) Take by mouth daily      aspirin 81 MG EC tablet Take 81 mg by mouth daily.  fluticasone (FLOVENT HFA) 110 MCG/ACT inhaler Inhale 2 puffs into the lungs 2 times daily 1 Inhaler 3     No current facility-administered medications for this visit. Review of Systems    Scanned, reviewed    Vitals:    05/03/22 1408   BP: (!) 144/92   Pulse: 86   SpO2: 97%     Constitutional: Well-developed, appears stated age, cooperative, in no acute distress  H/E/N/M/T:atraumatic, normocephalic, external ears, nose, lips normal without lesions  Eyes: Lids, lashes, conjunctivae and sclerae normal, No proptosis, no redness  Neck: supple, symmetrical, no swelling  Skin: No obvious rashes or lesions present.   Skin and hair texture normal  Psychiatric: Judgement and Insight:  judgement and insight appear normal  Neuro: Normal without focal findings, speech is normal normal, speech is spontaneous  Chest: No labored breathing, no chest deformity, no stridor  Musculoskeletal: No joint deformity, swelling      4/21  Skeletal foot exam is normal, no skin lesions, 10 g monofilament is 10/10 on the right and 10/10 on the left  Reduced on big toe    Lab Reviewed   No components found for: CHLPL  Lab Results   Component Value Date    TRIG 83 08/30/2021    TRIG 307 (H) 06/23/2020    TRIG 135 03/15/2018     Lab Results   Component Value Date    HDL 58 08/30/2021    HDL 42 06/23/2020    HDL 36 (L) 07/19/2019     Lab Results   Component Value Date    LDLCALC 124 (H) 08/30/2021    [facility] see below 06/23/2020    LDLCALC see below 07/19/2019     Lab Results   Component Value Date    LABVLDL 17 08/30/2021    LABVLDL see below 06/23/2020    LABVLDL see below 07/19/2019     Lab Results   Component Value Date    LABA1C 7.1 12/30/2021       Assessment:     Mulugeta Mendoza is a 40 y.o. male with :    1.T2DM:Insulin resistant secondary to obesity, + FH. Avoid GLP agonist given pancreatitis. Taking less insulin as changed diet  A1c better. Using lantus only,advise lower dose  2. HTN:Near goal  3. HLD:He has familial hyperlipidemia. Discussed dietary restriction, on fibrate and statin, added fish oil, TG higher. Advised low CHO instead of Keto diet as lipids are high. Lovaza not covered, check Vascepa, not covered  4. Obesity: s/p sleeve,gaining weight, advise loss    Plan:      Lantus 30 units daily   Humalog SSI 2 for 50 >150   Avoid GLP given pancreatitis history   Advise to check blood sugar 2 times a day   Patient to send blood sugar log for titration. Advise to exercise regularly. Advise to low simple carbohydrate and protein with each  meal diet. Diabetes Care: recommend yearly eye exam, foot exam and urine microalbumin to   creatinine ratio.  Patient is due    -Hyperlipidemia, LDL goal is <100 mg/dl    Once off insulin, will see PCP only

## 2022-06-03 ENCOUNTER — TELEPHONE (OUTPATIENT)
Dept: ENDOCRINOLOGY | Age: 47
End: 2022-06-03

## 2022-06-03 RX ORDER — INSULIN GLARGINE 100 [IU]/ML
INJECTION, SOLUTION SUBCUTANEOUS
Qty: 50 ML | Refills: 3 | Status: SHIPPED | OUTPATIENT
Start: 2022-06-03

## 2022-06-03 NOTE — TELEPHONE ENCOUNTER
The patient calling to advise that his lantus and statin formulary has changed and wants to know what the replacement drugs will be. Please call him when new medication has been order? [facility].

## 2022-06-03 NOTE — TELEPHONE ENCOUNTER
Lantus vials no longer formulary. Please advise if Semglee vials acceptable. If so, please [facility]  forular

## 2022-07-25 ENCOUNTER — OFFICE VISIT (OUTPATIENT)
Dept: PSYCHOLOGY | Age: 47
End: 2022-07-25
Payer: COMMERCIAL

## 2022-07-25 DIAGNOSIS — F41.1 GENERALIZED ANXIETY DISORDER WITH PANIC ATTACKS: Primary | ICD-10-CM

## 2022-07-25 DIAGNOSIS — F41.0 GENERALIZED ANXIETY DISORDER WITH PANIC ATTACKS: Primary | ICD-10-CM

## 2022-07-25 PROCEDURE — 90791 PSYCH DIAGNOSTIC EVALUATION: CPT | Performed by: PSYCHOLOGIST

## 2022-07-25 ASSESSMENT — ANXIETY QUESTIONNAIRES
7. FEELING AFRAID AS IF SOMETHING AWFUL MIGHT HAPPEN: 3-NEARLY EVERY DAY
3. WORRYING TOO MUCH ABOUT DIFFERENT THINGS: 2-OVER HALF THE DAYS
2. NOT BEING ABLE TO STOP OR CONTROL WORRYING: 2-OVER HALF THE DAYS
5. BEING SO RESTLESS THAT IT IS HARD TO SIT STILL: 2-OVER HALF THE DAYS
4. TROUBLE RELAXING: 2-OVER HALF THE DAYS
6. BECOMING EASILY ANNOYED OR IRRITABLE: 2-OVER HALF THE DAYS
GAD7 TOTAL SCORE: 16
1. FEELING NERVOUS, ANXIOUS, OR ON EDGE: 3

## 2022-07-25 ASSESSMENT — PATIENT HEALTH QUESTIONNAIRE - PHQ9
1. LITTLE INTEREST OR PLEASURE IN DOING THINGS: 0
2. FEELING DOWN, DEPRESSED OR HOPELESS: 1
SUM OF ALL RESPONSES TO PHQ9 QUESTIONS 1 & 2: 1
SUM OF ALL RESPONSES TO PHQ QUESTIONS 1-9: 1

## 2022-07-25 NOTE — PROGRESS NOTES
Behavioral Health Consultation  Eufemia Bettencourt, Ph.D.  Psychologist  2022  9:30 AM EDT      Time spent with Patient: 30 minutes  This is patient's first [facility] appointment. Reason for Consult: anxiety  Referring Provider: Nenita Almeida, [address] [id number]    Feedback for PCP:     Pt provided informed consent for the behavioral health program. Discussed with patient model of service to include the limits of confidentiality (i.e. abuse reporting, suicide intervention, etc.) and short-term intervention focused approach. Pt indicated understanding. Feedback given to PCP. S:  Patient reports that he has been anxious most of his life but within the last 5 weeks, it's spiked to the point of shaking and having debilitating anxiety. When asked if something had happened to spike his anxiety he said, \"[place]. \" He said that he has some apprehension about whether or not \"I have a job on Friday. \" He talked about how he and his wife have plans to escape the \"concrete hell\" and go live off the grid in [place].          Social History     Tobacco Use    Smoking status: Former     Packs/day: 1.00     Years: 6.00     Pack years: 6.00     Types: Cigarettes     Quit date: 1999     Years since quittin.3    Smokeless tobacco: Former   Substance Use Topics    Alcohol use: No        Illicit drugs:   Social History     Substance and Sexual Activity   Drug Use No        O:  MSE:  Appearance: good hygiene   Attitude: cooperative and friendly  Consciousness: alert  Orientation: oriented to person, place, time, general circumstance  Memory: recent and remote memory intact  Attention/Concentration: intact during session  Psychomotor Activity: normal  Eye Contact: normal  Speech: normal rate and volume, well-articulated  Mood: shaking when he presented, better at the end of the visit  Affect: congruent  Perception: within normal limits  Thought Content: within normal limits  Thought Process: logical, coherent and goal-directed  Insight: good  Judgment: intact  Ability to understand instructions: Yes  Ability to respond meaningfully: Yes  Morbid Ideation: no   Suicide Assessment: no suicidal ideation, plan, or intent  Homicidal Ideation: no    A:  We talked about evidence-base ways to treat anxiety and he was given handouts to help guide him. We talked about mindfulness and he was given a book recommendation. He was started on 5 mg of Lexapro by his PCP and he said that he can feel it working. When the patient gets insurance, he wants to schedule a F/U.     DEREK 7 SCORE 7/25/2022   DEREK-7 Total Score 16     Interpretation of DEREK-7 score: 5-9 = mild anxiety, 10-14 = moderate anxiety, 15+ = severe anxiety. Recommend referral to behavioral health for scores 10 or greater. PHQ Scores 7/25/2022 8/30/2021 5/15/2020 7/5/2019 2/6/2019 6/21/2018 3/14/2014   PHQ2 Score 1 0 0 0 0 0 2   PHQ9 Score 1 0 0 0 0 0 2     Interpretation of Total Score Depression Severity: 1-4 = Minimal depression, 5-9 = Mild depression, 10-14 = Moderate depression, 15-19 = Moderately severe depression, 20-27 = Severe depression    Diagnosis:    1.  Generalized anxiety disorder with panic attacks        Patient Active Problem List   Diagnosis    Abdominal pain    Essential hypertension    Type 2 diabetes mellitus with hyperglycemia ([facility])    Hypercholesteremia    Otalgia    Familial hyperlipidemia    Left leg pain    Sleep apnea    GERD (gastroesophageal reflux disease)    Migraine    Back pain    Morbid obesity with BMI of 40.0-44.9, adult ([facility])    S/P sleeve gastrectomy    Change in bowel function    Adhesive capsulitis of left shoulder    LEMUS (dyspnea on exertion)         Plan:  Pt interventions:  Established rapport, [place]-setting to identify pt's primary goals for [facility] visit / overall health, Supportive techniques, Provided Psychoeducation re: treating anxiety, Emphasized self-care as important for managing overall health, and Provided handout on deep breathing and managing thinking errors .        Pt Behavioral Change Plan:  Pt set the following goals:  Pt will schedule F/U visit when he has insurance

## 2022-07-25 NOTE — PATIENT INSTRUCTIONS
The [address] [facility] [place]) Consultant giving you this message provides team-based care to treat the mind and body. He works directly with your doctor, who will always stay in charge of your care. Most [facility] visits are 30 minutes or less. Usually, the [facility] provider and your doctor continue to see you until you are starting to do better and have a good plan in place for continued progress. Once that happens, most patients follow-up with just their doctor (though the [facility] provider remains available to you as needed). If you are not improving, or if you desire outside mental health treatment, or if your doctor recommends more specialized help, we will be happy to help you find a mental health specialist in the community. Please also note your health insurance may be billed for [facility] visits. Check with your insurance company, and tell the [facility] provider, if you have any questions about your [facility] coverage. Diaphragmatic Breathing Exercises    Exercise 1:  The Stimulating Breath (also called the Latha Breath)  This exercise aims to raise energy level and increase alertness. Inhale and exhale rapidly through your nose, keeping your mouth closed but relaxed. Your breaths in and out should be equal in duration, but as short as possible. This is a noisy breathing exercise. Try for three in-and-out breath cycles per second. This produces a quick movement of the diaphragm, suggesting a latha. Breathe normally after each cycle. Do not do for more than 15 seconds on your first try. Each time you practice the Stimulating Breath, you can increase your time by five seconds or so, until you reach a full minute. If done properly, you may feel invigorated, comparable to the heightened awareness you feel after a good workout. You should feel the effort at the back of the neck, the diaphragm, the chest and the abdomen.  Try this breathing exercise the next time you need an energy boost and feel yourself reaching for a cup of coffee. Exercise 2:  The 4-7-8 (or Relaxing Breath) Exercise  This exercise is utterly simple, takes almost no time, requires no equipment and can be done anywhere. Although you can do the exercise in any position, sit with your back straight while learning the exercise. Place the tip of your tongue against the ridge of tissue just behind your upper front teeth, and keep it there through the entire exercise. You will be exhaling through your mouth around your tongue; try pursing your lips slightly if this seems awkward. Exhale completely through your mouth, making a whoosh sound. Close your mouth and inhale quietly through your nose to a mental count of four. Hold your breath for a count of seven. Exhale completely through your mouth, making a whoosh sound to a count of eight. This is one breath. Now inhale again and repeat the cycle three more times for a total of four breaths. Note that you always inhale quietly through your nose and exhale audibly through your mouth. The tip of your tongue stays in position the whole time. Exhalation takes twice as long as inhalation. The absolute time you spend on each phase is not important; the ratio of 4:7:8 is important. If you have trouble holding your breath, speed the exercise up but keep to the ratio of 4:7:8 for the three phases. With practice you can slow it all down and get used to inhaling and exhaling more and more deeply. This exercise is a natural tranquilizer for the nervous system. Unlike tranquilizing drugs, which are often effective when you first take them but then lose their power over time, this exercise is subtle when you first try it but gains in power with repetition and practice. Do it at least twice a day. You cannot do it too frequently. Do NOT do more than 4 breaths at one time for the first month of practice. Later, if you wish, you can extend it to eight breaths.  If you feel a little lightheaded when you first breathe this way, do not be concerned; it will pass. Once you develop this technique by practicing it every day, it will be a very useful tool that you will always have with you. Use it whenever anything upsetting happens - before you react. Use it whenever you are aware of internal tension. Use it to help you fall asleep. This exercise cannot be recommended too highly. Everyone can benefit from it. Exercise 3:   Meditation Exercise: Breath Counting  If you want to get a feel for this challenging work, try your hand at breath counting, a deceptively simple technique. Sit in a comfortable position with the spine straight and head inclined slightly forward. Gently close your eyes and take a few deep breaths. Then let the breath come naturally without trying to influence it. Ideally it will be quiet and slow, but depth and rhythm may vary. To begin the exercise, count \"one\" to yourself as you exhale. The next time you exhale, count \"two,\" and so on up to Lasha. \"  Then begin a new cycle, counting \"one\" on the next exhalation. Never count higher than \"five,\" and count only when you exhale. You will know your attention has wandered when you find yourself up to \"eight,\" \"12,\" even \"19. \"  Try to do 10 minutes of this form of meditation. Personal Thought  Control. Our thinking often creates anxiety for us. Getting better control of our thinking can go a long way in helping us cope. The following steps can be useful. Let yourself become aware of thoughts you have when you are anxious. What are the words that you are saying to yourself at that moment? Sometimes it takes a little practice before we become aware of our thoughts. Some examples might be:  I know something bad is going to happen, or This is horrible or Gleda Keen is this happening to me!?  Write your thoughts down. Its much easier to work with our thoughts, analyze them, and replace them if they are in black and white.   Ask yourself the following questions about your thoughts:  Is it true? (Is it logically correct? Where is the evidence to support the truth of that thought? Are there alternative ways of thinking that would be more correct?). If a thought is not as true as it could be, replace it with a more realistic and helpful one. The majority of thoughts we have that generate anxiety are not the most realistic appraisals of the situation. So what? (If this is logically correct, what does it mean to me? Is there anything I can do about the situation? Is it in my best interest to get anxious about this?). Use coping self-statements. When feeling anxious, you may be able to tell yourself automatic phrases without thinking too much about it. A couple of examples would be phrases such as Its OK, I can handle it, or Ive been through things like this before and have done all right.   Notice that these statements tend to be true for all of us. Notice a change in your emotional state as you change your thinking. As your thoughts become more realistic, you will probably notice a decrease in anxiety and tension, and an increase in your ability to cope. Thinking Errors That Increase Stress, Anger, Depression, Anxiety, and Worry  All-or-Nothing Thinking. You see things in black-and-white categories. It is either one thing or another; there is no room for anything in between. I'm 100% healthy or I must have a fatal disease.   Jumping to Conclusions. You make a negative interpretation even though there are no definite facts that convincingly support your conclusion. My  is late because he has been in a car accident and is now injured on the side of the road.   Fortune-Telling. You anticipate things will turn out badly, convinced the prediction is a fact. Not getting this job will cause us to lose the house.   Should Statements. Musts and oughts are also offenders. Emotional consequences can include anxiety and anger.  I should be able to handle this.    Overgeneralization. Assuming one event is actually a pattern. My hand is a little shaky today, I must have Parkinson's disease.   Disqualifying the Positive. Filtering out or rejecting positive experiences to maintain negative beliefs. Upon hearing that your spouse has checked all the doors and windows and they are all locked you think, But someone could cut out a piece of glass and open the window.   Catastrophizing. Predicting the worst possible outcome imaginable. Terrible, awful, horrible, worst ever might be key words. If I can't get my heart to stop pounding I'm going to die.    Superstitious Thinking. The thought that something you do prevents something awful from happening. Ezequiel Long my spouse a hug and telling her to be careful before going to work will prevent her from getting in a wreck. I do it every morning and she hasn't gotten in a wreck yet.   Emotional Reasoning. The belief that because you feel a certain way means that the assumptions and association you have with that feeling are true. The fear, doom, and constant anxiety must mean something is seriously wrong with me.  Thinking Errors That Increase Stress, Anger, Depression, Anxiety, and Worry  All-or-Nothing Thinking. You see things in black-and-white categories. It is either one thing or another; there is no room for anything in between. I'm 100% healthy or I must have a fatal disease.   Jumping to Conclusions. You make a negative interpretation even though there are no definite facts that convincingly support your conclusion. My  is late because he has been in a car accident and is now injured on the side of the road.   Fortune-Telling. You anticipate things will turn out badly, convinced the prediction is a fact. Not getting this job will cause us to lose the house.   Should Statements. Musts and oughts are also offenders. Emotional consequences can include anxiety and anger.  I should be able to handle this.    Overgeneralization. Assuming one event is actually a pattern. My hand is a little shaky today, I must have Parkinson's disease.   Disqualifying the Positive. Filtering out or rejecting positive experiences to maintain negative beliefs. Upon hearing that your spouse has checked all the doors and windows and they are all locked you think, But someone could cut out a piece of glass and open the window.   Catastrophizing. Predicting the worst possible outcome imaginable. Terrible, awful, horrible, worst ever might be key words. If I can't get my heart to stop pounding I'm going to die.    Superstitious Thinking. The thought that something you do prevents something awful from happening. Jone Leriche my spouse a hug and telling her to be careful before going to work will prevent her from getting in a wreck. I do it every morning and she hasn't gotten in a wreck yet.   Emotional Reasoning. The belief that because you feel a certain way means that the assumptions and association you have with that feeling are true. The fear, doom, and constant anxiety must mean something is seriously wrong with me. 

## 2024-01-05 ENCOUNTER — TELEPHONE (OUTPATIENT)
Dept: ORTHOPEDIC SURGERY | Age: 49
End: 2024-01-05

## 2024-01-05 NOTE — TELEPHONE ENCOUNTER
Appointment Request     Patient requesting earlier appointment: Yes  Appointment offered to patient: N/A  Patient Contact Number: [phone]    PATIENT IS REQUESTING TO BE SEEN 1/6 WITH DR SANCHEZ FOR HIS RIGHT SHOULDER

## 2024-01-09 ENCOUNTER — OFFICE VISIT (OUTPATIENT)
Dept: ORTHOPEDIC SURGERY | Age: 49
End: 2024-01-09

## 2024-01-09 VITALS — RESPIRATION RATE: 12 BRPM | BODY MASS INDEX: 27.72 KG/M2 | WEIGHT: 198 LBS | HEIGHT: 71 IN

## 2024-01-09 DIAGNOSIS — M75.111 INCOMPLETE ROTATOR CUFF TEAR OR RUPTURE OF RIGHT SHOULDER, NOT SPECIFIED AS TRAUMATIC: ICD-10-CM

## 2024-01-09 DIAGNOSIS — M25.511 ACUTE PAIN OF RIGHT SHOULDER: Primary | ICD-10-CM

## 2024-01-09 DIAGNOSIS — M75.21 BICEPS TENDINITIS OF RIGHT SHOULDER: ICD-10-CM

## 2024-01-09 DIAGNOSIS — M19.011 ARTHROSIS OF RIGHT ACROMIOCLAVICULAR JOINT: ICD-10-CM

## 2024-01-09 PROCEDURE — 99203 OFFICE O/P NEW LOW 30 MIN: CPT | Performed by: ORTHOPAEDIC SURGERY

## 2024-01-09 RX ORDER — METHYLPREDNISOLONE ACETATE 40 MG/ML
80 INJECTION, SUSPENSION INTRA-ARTICULAR; INTRALESIONAL; INTRAMUSCULAR; SOFT TISSUE ONCE
Status: CANCELLED | OUTPATIENT
Start: 2024-01-09 | End: 2024-01-09

## 2024-01-09 NOTE — TELEPHONE ENCOUNTER
Meds allowed after 1900:    Abilify   Thorazine   Clozaril   Valium   Prolixin   Haldol  Invega Sustenna   Ativan   Zyprexa  Risperdal  Geodon   Midazolam     Also ordered: EKGs, Labs & VS    Sent Semglee insulin

## 2024-01-09 NOTE — PROGRESS NOTES
Ciro Sullivan is seen today for his right shoulder.  I last evaluated the shoulder several years ago.  We initially had him planned for right shoulder arthroscopy with rotator cuff repair but it was canceled due to his concomitant medical problems including an A1c that was over 11.  Since that time he had near complete resolution of symptoms.    His pain is returned more recently.  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  Pain can be as bad as 10 out of 10 in the lateral right arm.      His medical problems are dramatically improved.      History: Patient's relevant past family, medical, and social history are reviewed as part of today's visit. ROS of pertinent positives and negatives as above; otherwise negative.    General Exam:    Vitals: Resp. rate 12, height 1.803 m (5' 11\").  Constitutional: Patient is adequately groomed with no evidence of malnutrition  Mental Status: The patient is oriented to time, place and person.  The patient's mood and affect are appropriate.  Gait:  Patient walks with normal gait and station.  Lymphatic: The lymphatic examination bilaterally reveals all areas to be without enlargement or induration.  Vascular: Examination reveals no swelling or calf tenderness.  Peripheral pulses are palpable and 2+.  Neurological: The patient has good coordination.  There is no weakness or sensory deficit.    Skin:    Head/Neck: inspection reveals no rashes, ulcerations or lesions.  Trunk:  inspection reveals no rashes, ulcerations or lesions.  Right Lower Extremity: inspection reveals no rashes, ulcerations or lesions.  Left Lower Extremity: inspection reveals no rashes, ulcerations or lesions.    Examination of the cervical spine reveals no restriction in motion.  There are no reproduction of symptoms into either arm with flexion, extension, rotation or palpation.  The patient has a negative Spurling

## 2024-03-21 ENCOUNTER — APPOINTMENT (OUTPATIENT)
Dept: GENERAL RADIOLOGY | Age: 49
End: 2024-03-21
Payer: COMMERCIAL

## 2024-03-21 ENCOUNTER — HOSPITAL ENCOUNTER (EMERGENCY)
Age: 49
Discharge: HOME | End: 2024-03-21
Attending: EMERGENCY MEDICINE
Payer: COMMERCIAL

## 2024-03-21 VITALS
RESPIRATION RATE: 16 BRPM | DIASTOLIC BLOOD PRESSURE: 91 MMHG | TEMPERATURE: 97.6 F | WEIGHT: 201.5 LBS | BODY MASS INDEX: 29.84 KG/M2 | HEART RATE: 72 BPM | OXYGEN SATURATION: 100 % | SYSTOLIC BLOOD PRESSURE: 162 MMHG | HEIGHT: 69 IN

## 2024-03-21 DIAGNOSIS — S20.211A CONTUSION OF RIGHT CHEST WALL, INITIAL ENCOUNTER: Primary | ICD-10-CM

## 2024-03-21 PROCEDURE — 99283 EMERGENCY DEPT VISIT LOW MDM: CPT

## 2024-03-21 PROCEDURE — 71101 X-RAY EXAM UNILAT RIBS/CHEST: CPT

## 2024-03-21 RX ORDER — IBUPROFEN 800 MG/1
800 TABLET ORAL EVERY 8 HOURS PRN
Qty: 30 TABLET | Refills: 0 | Status: SHIPPED | OUTPATIENT
Start: 2024-03-21

## 2024-03-21 RX ORDER — CYCLOBENZAPRINE HCL 10 MG
10 TABLET ORAL 3 TIMES DAILY PRN
Qty: 30 TABLET | Refills: 0 | Status: SHIPPED | OUTPATIENT
Start: 2024-03-21 | End: 2024-03-31

## 2024-03-21 ASSESSMENT — PAIN DESCRIPTION - DESCRIPTORS: DESCRIPTORS: ACHING

## 2024-03-21 ASSESSMENT — PAIN - FUNCTIONAL ASSESSMENT
PAIN_FUNCTIONAL_ASSESSMENT: 0-10
PAIN_FUNCTIONAL_ASSESSMENT: 0-10

## 2024-03-21 ASSESSMENT — PAIN SCALES - GENERAL
PAINLEVEL_OUTOF10: 0
PAINLEVEL_OUTOF10: 2
PAINLEVEL_OUTOF10: 4

## 2024-03-21 ASSESSMENT — PAIN DESCRIPTION - PAIN TYPE: TYPE: ACUTE PAIN

## 2024-03-21 ASSESSMENT — PAIN DESCRIPTION - LOCATION: LOCATION: CHEST;RIB CAGE

## 2024-03-21 NOTE — ED NOTES
Discharge and education instructions reviewed with patient. Teach-back successful.  Pt verbalized understanding. Pt denied questions at this time. No acute distress noted. Patient instructed to follow-up as noted - return to emergency department if symptoms worsen. Patient verbalized understanding. Discharged per EDMD with discharge instructions. Pt discharged to private vehicle. Patient stable upon departure. Thanked patient for choosing [facility] care.

## 2024-03-21 NOTE — ED PROVIDER NOTES
EMERGENCY DEPARTMENT ENCOUNTER     [facility]     Pt Name: Ciro Sullivan   MRN: [id number]   Birthdate 1975   Date of evaluation: 3/21/2024   Provider: Antelmo Greenberg MD   PCP: Lauryn Morales APRN - CNP   Note Started: 1:46 PM EDT 3/21/24     CHIEF COMPLAINT     Chief Complaint   Patient presents with    Rib Injury    Chest Injury     Patient was putting together a green house. He was putting the metal bar up. A fork lift hit the green house. The metal bar came down and hit him on  the chest/ rib area.         HISTORY OF PRESENT ILLNESS:  History from : Patient   Limitations to history : None     Ciro Sullivan is a 48 y.o. male who presents for chest pain.  Patient reports few days ago he was helping assemble a greenhouse and a large bar struck him in the chest.  Since then he has had pain in the mid and right chest wall is prickly with deep breathing or with coughing.  No shortness of breath.  No vomiting.  Patient denies any other complaints or injuries.    Nursing Notes were all reviewed and agreed with or any disagreements were addressed in the HPI.     ROS: Positives and Pertinent negatives as per HPI.    PAST MEDICAL HISTORY     Past medical history:  has a past medical history of Diabetes mellitus (HCC), GERD (gastroesophageal reflux disease), Hyperlipidemia, Hypertension, Sleep apnea, Type II or unspecified type diabetes mellitus without mention of complication, not stated as uncontrolled, and Wears glasses.    Past surgical history:  has a past surgical history that includes Dental surgery; Sleeve Gastrectomy (4/7/15); Endoscopy, colon, diagnostic (11/14/2014); Elbow Debridement (Right, 02/13/2019); BIOPSY SOFT TISSUE ELBOW (Right, 2/13/2019); and Shoulder arthroscopy (Left, 09/01/2018).      PHYSICAL EXAM:  ED Triage Vitals [03/21/24 0732]   BP Temp Temp Source Pulse Respirations SpO2 Height Weight - Scale   (!) 173/94 97.6 °F (36.4 °C) Tympanic 78 16 97 % 1.753 m

## (undated) DEVICE — DRAPE HND W114XL142IN BLU POLYPR W O PCH FEN CRD AND TB HLDR

## (undated) DEVICE — SHEET,DRAPE,53X77,STERILE: Brand: MEDLINE

## (undated) DEVICE — MAJOR SET UP PK

## (undated) DEVICE — SUTURE VCRL SZ 0 L18IN ABSRB UD L36MM CT-1 1/2 CIR J840D

## (undated) DEVICE — Device

## (undated) DEVICE — 3M™ COBAN™ NL STERILE NON-LATEX SELF-ADHERENT WRAP, 2084S, 4 IN X 5 YD (10 CM X 4,5 M), 18 ROLLS/CASE: Brand: 3M™ COBAN™

## (undated) DEVICE — BANDAGE COMPR W4INXL12FT E DISP ESMARCH EVEN

## (undated) DEVICE — STRIP,CLOSURE,WOUND,MEDI-STRIP,1/2X4: Brand: MEDLINE

## (undated) DEVICE — STERILE POLYISOPRENE POWDER-FREE SURGICAL GLOVES: Brand: PROTEXIS

## (undated) DEVICE — COVER LT HNDL BLU PLAS

## (undated) DEVICE — SUTURE VCRL SZ 3-0 L18IN ABSRB UD L26MM SH 1/2 CIR J864D

## (undated) DEVICE — STERILE LATEX POWDER-FREE SURGICAL GLOVESWITH NITRILE COATING: Brand: PROTEXIS

## (undated) DEVICE — SPONGE GZ W4XL4IN COT 12 PLY TYP VII WVN C FLD DSGN

## (undated) DEVICE — DRAPE,U/SHT,SPLIT,FILM,60X84,STERILE: Brand: MEDLINE

## (undated) DEVICE — GOWN,REINF,POLY,AURORA,XLNG/XL,STRL: Brand: MEDLINE

## (undated) DEVICE — ZIMMER® STERILE DISPOSABLE TOURNIQUET CUFF WITH PLC, DUAL PORT, SINGLE BLADDER, 18 IN. (46 CM)

## (undated) DEVICE — SYRINGE MED 10ML LUERLOCK TIP W/O SFTY DISP

## (undated) DEVICE — NEEDLE HYPO 23GA L1.5IN TURQ POLYPR HUB S STL THN WALL IM

## (undated) DEVICE — CHLORAPREP 26ML ORANGE

## (undated) DEVICE — INTENDED FOR TISSUE SEPARATION, AND OTHER PROCEDURES THAT REQUIRE A SHARP SURGICAL BLADE TO PUNCTURE OR CUT.: Brand: BARD-PARKER ® STAINLESS STEEL BLADES

## (undated) DEVICE — SUTURE MCRYL SZ 4-0 L18IN ABSRB UD L19MM PS-2 3/8 CIR PRIM Y496G

## (undated) DEVICE — PADDING UNDERCAST W4INXL4YD 100% COT CRIMPED FINISH WBRL II

## (undated) DEVICE — SUTURE VCRL SZ 2-0 L18IN ABSRB UD CT-1 L36MM 1/2 CIR J839D

## (undated) DEVICE — SOLUTION IV IRRIG POUR BRL 0.9% SODIUM CHL 2F7124

## (undated) DEVICE — STOCKINETTE,IMPERVIOUS,12X48,STERILE: Brand: MEDLINE

## (undated) DEVICE — CANISTER, RIGID, 1200CC: Brand: MEDLINE INDUSTRIES, INC.

## (undated) DEVICE — ALCOHOL RUBBING ISO 16OZ 70%